# Patient Record
Sex: FEMALE | Race: WHITE | URBAN - NONMETROPOLITAN AREA
[De-identification: names, ages, dates, MRNs, and addresses within clinical notes are randomized per-mention and may not be internally consistent; named-entity substitution may affect disease eponyms.]

---

## 2019-05-16 ENCOUNTER — OFFICE VISIT (OUTPATIENT)
Dept: PSYCHIATRY | Age: 64
End: 2019-05-16
Payer: COMMERCIAL

## 2019-05-16 VITALS — BODY MASS INDEX: 29.51 KG/M2 | HEIGHT: 67 IN | WEIGHT: 188 LBS

## 2019-05-16 DIAGNOSIS — F33.0 MILD EPISODE OF RECURRENT MAJOR DEPRESSIVE DISORDER (HCC): Primary | ICD-10-CM

## 2019-05-16 DIAGNOSIS — F41.9 ANXIETY: ICD-10-CM

## 2019-05-16 PROCEDURE — 90792 PSYCH DIAG EVAL W/MED SRVCS: CPT | Performed by: NURSE PRACTITIONER

## 2019-05-16 RX ORDER — ONDANSETRON 4 MG/1
4 TABLET, FILM COATED ORAL EVERY 8 HOURS PRN
COMMUNITY

## 2019-05-16 RX ORDER — FLUTICASONE PROPIONATE 50 MCG
2 SPRAY, SUSPENSION (ML) NASAL DAILY
COMMUNITY

## 2019-05-16 RX ORDER — OXYBUTYNIN CHLORIDE 10 MG/1
10 TABLET, EXTENDED RELEASE ORAL DAILY
COMMUNITY

## 2019-05-16 SDOH — SOCIAL STABILITY: SOCIAL NETWORK
IN A TYPICAL WEEK, HOW MANY TIMES DO YOU TALK ON THE PHONE WITH FAMILY, FRIENDS, OR NEIGHBORS?: MORE THAN THREE TIMES A WEEK

## 2019-05-16 SDOH — SOCIAL STABILITY: SOCIAL NETWORK: HOW OFTEN DO YOU ATTEND CHURCH OR RELIGIOUS SERVICES?: MORE THAN 4 TIMES PER YEAR

## 2019-05-16 SDOH — ECONOMIC STABILITY: INCOME INSECURITY: HOW HARD IS IT FOR YOU TO PAY FOR THE VERY BASICS LIKE FOOD, HOUSING, MEDICAL CARE, AND HEATING?: NOT VERY HARD

## 2019-05-16 SDOH — HEALTH STABILITY: PHYSICAL HEALTH: ON AVERAGE, HOW MANY DAYS PER WEEK DO YOU ENGAGE IN MODERATE TO STRENUOUS EXERCISE (LIKE A BRISK WALK)?: 0 DAYS

## 2019-05-16 SDOH — SOCIAL STABILITY: SOCIAL NETWORK
DO YOU BELONG TO ANY CLUBS OR ORGANIZATIONS SUCH AS CHURCH GROUPS UNIONS, FRATERNAL OR ATHLETIC GROUPS, OR SCHOOL GROUPS?: YES

## 2019-05-16 SDOH — ECONOMIC STABILITY: TRANSPORTATION INSECURITY
IN THE PAST 12 MONTHS, HAS LACK OF TRANSPORTATION KEPT YOU FROM MEETINGS, WORK, OR FROM GETTING THINGS NEEDED FOR DAILY LIVING?: NO

## 2019-05-16 SDOH — SOCIAL STABILITY: SOCIAL INSECURITY: WITHIN THE LAST YEAR, HAVE YOU BEEN AFRAID OF YOUR PARTNER OR EX-PARTNER?: NO

## 2019-05-16 SDOH — SOCIAL STABILITY: SOCIAL NETWORK: HOW OFTEN DO YOU ATTENT MEETINGS OF THE CLUB OR ORGANIZATION YOU BELONG TO?: MORE THAN 4 TIMES PER YEAR

## 2019-05-16 SDOH — ECONOMIC STABILITY: FOOD INSECURITY: WITHIN THE PAST 12 MONTHS, THE FOOD YOU BOUGHT JUST DIDN'T LAST AND YOU DIDN'T HAVE MONEY TO GET MORE.: NEVER TRUE

## 2019-05-16 SDOH — SOCIAL STABILITY: SOCIAL NETWORK: ARE YOU MARRIED, WIDOWED, DIVORCED, SEPARATED, NEVER MARRIED, OR LIVING WITH A PARTNER?: MARRIED

## 2019-05-16 SDOH — HEALTH STABILITY: MENTAL HEALTH
STRESS IS WHEN SOMEONE FEELS TENSE, NERVOUS, ANXIOUS, OR CAN'T SLEEP AT NIGHT BECAUSE THEIR MIND IS TROUBLED. HOW STRESSED ARE YOU?: TO SOME EXTENT

## 2019-05-16 SDOH — SOCIAL STABILITY: SOCIAL NETWORK: HOW OFTEN DO YOU GET TOGETHER WITH FRIENDS OR RELATIVES?: MORE THAN THREE TIMES A WEEK

## 2019-05-16 SDOH — ECONOMIC STABILITY: FOOD INSECURITY: WITHIN THE PAST 12 MONTHS, YOU WORRIED THAT YOUR FOOD WOULD RUN OUT BEFORE YOU GOT MONEY TO BUY MORE.: NEVER TRUE

## 2019-05-16 SDOH — SOCIAL STABILITY: SOCIAL INSECURITY: WITHIN THE LAST YEAR, HAVE YOU BEEN HUMILIATED OR EMOTIONALLY ABUSED IN OTHER WAYS BY YOUR PARTNER OR EX-PARTNER?: NO

## 2019-05-16 SDOH — SOCIAL STABILITY: SOCIAL INSECURITY
WITHIN THE LAST YEAR, HAVE YOU BEEN KICKED, HIT, SLAPPED, OR OTHERWISE PHYSICALLY HURT BY YOUR PARTNER OR EX-PARTNER?: NO

## 2019-05-16 SDOH — SOCIAL STABILITY: SOCIAL INSECURITY
WITHIN THE LAST YEAR, HAVE TO BEEN RAPED OR FORCED TO HAVE ANY KIND OF SEXUAL ACTIVITY BY YOUR PARTNER OR EX-PARTNER?: NO

## 2019-05-16 SDOH — ECONOMIC STABILITY: TRANSPORTATION INSECURITY
IN THE PAST 12 MONTHS, HAS THE LACK OF TRANSPORTATION KEPT YOU FROM MEDICAL APPOINTMENTS OR FROM GETTING MEDICATIONS?: NO

## 2019-05-16 ASSESSMENT — ANXIETY QUESTIONNAIRES
3. WORRYING TOO MUCH ABOUT DIFFERENT THINGS: 1-SEVERAL DAYS
2. NOT BEING ABLE TO STOP OR CONTROL WORRYING: 0-NOT AT ALL SURE
5. BEING SO RESTLESS THAT IT IS HARD TO SIT STILL: 0-NOT AT ALL SURE
GAD7 TOTAL SCORE: 3
1. FEELING NERVOUS, ANXIOUS, OR ON EDGE: 1-SEVERAL DAYS
6. BECOMING EASILY ANNOYED OR IRRITABLE: 1-SEVERAL DAYS
7. FEELING AFRAID AS IF SOMETHING AWFUL MIGHT HAPPEN: 0-NOT AT ALL SURE
4. TROUBLE RELAXING: 0-NOT AT ALL SURE

## 2019-05-16 ASSESSMENT — PATIENT HEALTH QUESTIONNAIRE - PHQ9
8. MOVING OR SPEAKING SO SLOWLY THAT OTHER PEOPLE COULD HAVE NOTICED. OR THE OPPOSITE, BEING SO FIGETY OR RESTLESS THAT YOU HAVE BEEN MOVING AROUND A LOT MORE THAN USUAL: 0
3. TROUBLE FALLING OR STAYING ASLEEP: 1
5. POOR APPETITE OR OVEREATING: 1
SUM OF ALL RESPONSES TO PHQ QUESTIONS 1-9: 3
6. FEELING BAD ABOUT YOURSELF - OR THAT YOU ARE A FAILURE OR HAVE LET YOURSELF OR YOUR FAMILY DOWN: 0
4. FEELING TIRED OR HAVING LITTLE ENERGY: 1
7. TROUBLE CONCENTRATING ON THINGS, SUCH AS READING THE NEWSPAPER OR WATCHING TELEVISION: 0
1. LITTLE INTEREST OR PLEASURE IN DOING THINGS: 0
2. FEELING DOWN, DEPRESSED OR HOPELESS: 0
10. IF YOU CHECKED OFF ANY PROBLEMS, HOW DIFFICULT HAVE THESE PROBLEMS MADE IT FOR YOU TO DO YOUR WORK, TAKE CARE OF THINGS AT HOME, OR GET ALONG WITH OTHER PEOPLE: 0
9. THOUGHTS THAT YOU WOULD BE BETTER OFF DEAD, OR OF HURTING YOURSELF: 0
SUM OF ALL RESPONSES TO PHQ9 QUESTIONS 1 & 2: 0
SUM OF ALL RESPONSES TO PHQ QUESTIONS 1-9: 3
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
## 2019-05-16 NOTE — PATIENT INSTRUCTIONS
Stop Cymbalta by taking Cymbalta 20mg 1 tab by mouth once daily on Saturday and Monday and then stop. Stop Lorazepam (Ativan) 1mg by taking Lorazepam 1mg one-half tablet by mouth every other day for 5 doses and then stop. Patient has been instructed to seek emergency help via the emergency and/or calling 911 should symptoms become severe, worsen, or with other concerning symptoms. Patient instructed to go immediately to the emergency room and/or call 911 with any suicidal or homicidal ideations or if audio/visual hallucinations develop. Patient given crisis center information.

## 2019-05-22 ENCOUNTER — OFFICE VISIT (OUTPATIENT)
Dept: FAMILY MEDICINE CLINIC | Age: 64
End: 2019-05-22
Payer: COMMERCIAL

## 2019-05-22 VITALS
WEIGHT: 184.2 LBS | RESPIRATION RATE: 10 BRPM | HEIGHT: 65 IN | SYSTOLIC BLOOD PRESSURE: 124 MMHG | OXYGEN SATURATION: 98 % | HEART RATE: 65 BPM | BODY MASS INDEX: 30.69 KG/M2 | DIASTOLIC BLOOD PRESSURE: 78 MMHG | TEMPERATURE: 98.2 F

## 2019-05-22 DIAGNOSIS — K58.2 IRRITABLE BOWEL SYNDROME WITH BOTH CONSTIPATION AND DIARRHEA: ICD-10-CM

## 2019-05-22 DIAGNOSIS — R10.13 EPIGASTRIC PAIN: Primary | ICD-10-CM

## 2019-05-22 DIAGNOSIS — R53.83 TIRED: ICD-10-CM

## 2019-05-22 DIAGNOSIS — I10 ESSENTIAL HYPERTENSION: ICD-10-CM

## 2019-05-22 DIAGNOSIS — G47.9 SLEEP DIFFICULTIES: ICD-10-CM

## 2019-05-22 DIAGNOSIS — M54.40 LOW BACK PAIN WITH SCIATICA, SCIATICA LATERALITY UNSPECIFIED, UNSPECIFIED BACK PAIN LATERALITY, UNSPECIFIED CHRONICITY: ICD-10-CM

## 2019-05-22 DIAGNOSIS — R19.4 CHANGE IN BOWEL HABITS: ICD-10-CM

## 2019-05-22 DIAGNOSIS — E78.5 ELEVATED LIPIDS: ICD-10-CM

## 2019-05-22 DIAGNOSIS — R41.3 MEMORY DIFFICULTIES: ICD-10-CM

## 2019-05-22 PROCEDURE — 99204 OFFICE O/P NEW MOD 45 MIN: CPT | Performed by: FAMILY MEDICINE

## 2019-05-22 RX ORDER — DICYCLOMINE HYDROCHLORIDE 10 MG/1
CAPSULE ORAL PRN
COMMUNITY
Start: 2019-04-15 | End: 2019-07-30 | Stop reason: ALTCHOICE

## 2019-05-22 RX ORDER — VITAMIN B COMPLEX
1 CAPSULE ORAL NIGHTLY
COMMUNITY
End: 2019-05-22

## 2019-05-22 RX ORDER — MULTIVITAMIN WITH IRON
250 TABLET ORAL
COMMUNITY

## 2019-05-22 RX ORDER — PROMETHAZINE HYDROCHLORIDE 25 MG/1
TABLET ORAL PRN
COMMUNITY
Start: 2019-04-15

## 2019-05-22 ASSESSMENT — ENCOUNTER SYMPTOMS
RESPIRATORY NEGATIVE: 1
DIARRHEA: 1
BACK PAIN: 1
ABDOMINAL PAIN: 1
NAUSEA: 1
SINUS PAIN: 1
ABDOMINAL DISTENTION: 1
EYE PAIN: 1
VOMITING: 1

## 2019-05-22 NOTE — PATIENT INSTRUCTIONS
Thank you   1. Thank you for trusting us with your healthcare needs. You may receive a survey regarding today's visit. It would help us out if you would take a few moments to provide your feedback. We value your input. 2. Please bring in ALL medications BOTTLES, including inhalers, herbal supplements, over the counter, prescribed & non-prescribed medicine. The office would like actual medication bottles and a list.   3. Please note our OFFICE POLICIES:   a. Prior to getting your labs drawn, please check with your insurance company for benefits and eligibility of lab services. Often, insurance companies cover certain tests for preventative visits only. It is patient's responsibility to see what is covered. b. We are unable to change a diagnosis after the test has been performed. c. Lab orders will not be re-printed. Please hold onto your original lab orders and take them to your lab to be completed. d. If you no show your scheduled appointment three times, you will be dismissed from this practice. e. Tru Plata must be completed 24 hours prior to your schedule appointment. 4. If the list below has been completed, PLEASE FAX RECORDS TO OUR OFFICE @ [phone].  Once the records have been received we will update your records at our office:  Health Maintenance Due   Topic Date Due    Hepatitis C screen  1955    HIV screen  09/30/1970    DTaP/Tdap/Td vaccine (1 - Tdap) 09/30/1974    Cervical cancer screen  09/30/1976    Diabetes screen  09/30/1995    Shingles Vaccine (1 of 2) 09/30/2005    Colon cancer screen colonoscopy  09/30/2005    Potassium monitoring  12/20/2013    Creatinine monitoring  12/20/2013    Breast cancer screen  12/20/2014    Lipid screen  12/20/2017

## 2019-05-22 NOTE — PROGRESS NOTES
[address] [facility]. [address] [facility] [address]  Dept: [phone]  Dept Fax: [phone]  Loc: [phone]      Ivan Norton is a 61 y.o. White female. Joni Williamson  presents to the [facility] today for   Chief Complaint   Patient presents with   Kasandra May [facility] Protocol    Fatigue     chronic fatigue immuity, gi issues, diverticulitis-Dr. Jerzy Addison Hypertension     meds changes    Abdominal Pain     hiatal hernia, with bouts of vomiting and diarrhea    Depression     started in Dec   ,  and;   1. Epigastric pain    2. Essential hypertension    3. Tired    4. Change in bowel habits    5. Low back pain with sciatica, sciatica laterality unspecified, unspecified back pain laterality, unspecified chronicity    6. Irritable bowel syndrome with both constipation and diarrhea    7. Sleep difficulties    8. Memory difficulties    9. Elevated lipids      I have reviewed Ivan Norton medical, surgical and other pertinent history in detail, and have updated medication and allergy information in the computerized patientrecord. Clinical Care Team:     -Referring Provider for today's consult: Self Referred  -Primary Care Provider: UMER Sullivan - [facility]    Medical/Surgical History:   She  has a past medical history of Anxiety, Chronic kidney disease, Depression, Diverticulosis, EBV infection, Esophageal dilatation, Fractured rib, GERD (gastroesophageal reflux disease), Heart murmur, Hiatal hernia, Hyperlipidemia, and Hypertension. Her  has a past surgical history that includes Hysterectomy (2005); Tonsillectomy and adenoidectomy (1994); Bladder surgery (2006); Colonoscopy (2012); Upper gastrointestinal endoscopy (2016); Cholecystectomy; Gastric fundoplication (61/41/5492); and gastroplasty (06/28/2017).     Family/Social History:     Her family history includes Alcohol Abuse in her father; (ZOFRAN) 4 MG tablet, Take 4 mg by mouth every 8 hours as needed for Nausea or Vomiting, Disp: , Rfl:     VORTIoxetine (TRINTELLIX) 5 MG tablet, Take 1 tablet by mouth daily, Disp: 30 tablet, Rfl: 0    polyethylene glycol (MIRALAX) PACK packet, Take 17 g by mouth as needed , Disp: , Rfl:     Cholecalciferol (VITAMIN D3) 2000 UNITS CAPS, Take 2,000 Units by mouth daily, Disp: , Rfl:     olmesartan-hydrochlorothiazide (BENICAR HCT) 40-25 MG per tablet, Take 1 tablet by mouth daily, Disp: , Rfl:     pantoprazole (PROTONIX) 40 MG tablet, Take 40 mg by mouth every morning (before breakfast) , Disp: , Rfl:     celecoxib (CELEBREX) 200 MG capsule, Take 200 mg by mouth daily, Disp: , Rfl:     fenofibrate (TRICOR) 145 MG tablet, Take 145 mg by mouth daily , Disp: , Rfl:     pravastatin (PRAVACHOL) 80 MG tablet, Take 80 mg by mouth daily, Disp: , Rfl:     amLODIPine (NORVASC) 5 MG tablet, Take 5 mg by mouth daily , Disp: , Rfl:     ranitidine (ZANTAC) 150 MG tablet, Take 150 mg by mouth nightly, Disp: , Rfl:     Cetirizine HCl (ZYRTEC ALLERGY PO), Take 10 mg by mouth nightly , Disp: , Rfl:     Simethicone (MAALOX ANTI-GAS PO), Take by mouth as needed, Disp: , Rfl:   Allergies: Compazine [prochlorperazine maleate] and Penicillins,  Immunizations: There is no immunization history on file for this patient. History of PresentIllness:     Dina Prasad had concerns including Established New Doctor (C[facility] Protocol); Fatigue (chronic fatigue immuity, gi issues, diverticulitis-Dr. Raghav Antonio); Hypertension (meds changes); Abdominal Pain (hiatal hernia, with bouts of vomiting and diarrhea); and Depression (started in Dec). Vikash Bishop  presents to the [address] today for;   Chief Complaint   Patient presents with   Lilo Mora New Doctor     [facility] Protocol    Fatigue     chronic fatigue immuity, gi issues, diverticulitis-Dr. Frankie Tracy Hypertension     meds changes    Abdominal Pain     hiatal hernia, Thought content normal.   Nursing note and vitals reviewed. Laboratory Data:   Lab results were searched in Care Everywhere and/or those brought by the pateint were reviewed today with Brannon Houston and she has a copy of their most recent labs to take home with them as notedbelow;       Imaging Data:   Imaging Data:       Assessment & Plan:       Impression:  1. Epigastric pain    2. Essential hypertension    3. Tired    4. Change in bowel habits    5. Low back pain with sciatica, sciatica laterality unspecified, unspecified back pain laterality, unspecified chronicity    6. Irritable bowel syndrome with both constipation and diarrhea    7. Sleep difficulties    8. Memory difficulties    9. Elevated lipids      Assessment and Plan:  After reviewing the patients chief complaints, reviewing their labfindings in great detail (with the patient and those accompanying them) which correlate to their chief complaints, symptoms, and or medical conditions; suggestions were made relating to changes in diet and or supplementswhich may improve the complaints and which will be reflected in their future lab findings; Chief Complaint   Patient presents with   Betsy Alfred New Doctor     [facility] Protocol    Fatigue     chronic fatigue immuity, gi issues, diverticulitis-Dr. Ambika Tyson Hypertension     meds changes    Abdominal Pain     hiatal hernia, with bouts of vomiting and diarrhea    Depression     started in Dec   ;    Plans for the next visits:  - Abnormal and non-optimal Labs were ordered today to be repeated in the next 120-365 days to assess changes from adjustments in nutrition and or nutrients.    - Patient instructed when having ablood draw to ask the  to divide their lab draws into multiple draws over several days if not feeling good at the time of the lab draw or if either prefers to do several smaller blood draws over several days  -Patient instructed to check with insurer before each lab draw and to to to the lab which the insurer directs them for the most cost effective lab draw with the least patient's cost  - Peggy Vidales  will be scheduled subsequentto those results. - Peggy Vidales will bring in her drink and food log to her next visit    Chronic Problems Addressed on this Visit:                                   1.  Intensity of Service; Uncontrolled items at this visit; Chief Complaint   Patient presents with   Stefan Velasquez Doctor     [facility] Protocol    Fatigue     chronic fatigue immuity, gi issues, diverticulitis-Dr. Pilar Sommers Hypertension     meds changes    Abdominal Pain     hiatal hernia, with bouts of vomiting and diarrhea    Depression     started in Dec   ;              Improved items at this visit; Stable items atthis visit;  2. Patients food and drinks were reviewed with the patient,       - Peggy Vidales will bring food+drink symptom log to next visit for inclusion in their record      - 75 better food list reviewed & given topatient with the omega 6 food list to avoid         - Gluten in corn and oats abstracts sheet reviewed and given to the patient today   3. Greater than 45 minutes were spent face to face on this visit of which >50% was for counseling and coordination of care. Patients food and drinks were reviewed with thepatient,   - they will bring a food drink symptom log to future visits for inclusion in their record    - 75 better food list reviewed & given to patient along with the omega 6 food list to avoid      - Glutenin corn and oats abstracts sheet reviewed and given to the patient today    - 23 Foods containing Latex-like proteins was reviewed and copy to be taken if desired     - Nutrient Supplements list provided and copyto be taken if desired    - [email or web address]. [employer] web site offered to patient to review at their convenience by staff with login information    Note:  I have discussed with the patient that with all nutraceuticals, there is often mixed data and emerging research which needs to be monitored; as well as an array of NIHfact sheets on nutrients and supplements. If I have recommended cinnamon at the request of this patient to assist them in control of their blood sugar, triglyceride and or weight issues. I discussed that thepatient's clinical use of cinnamon bark, calcium, magnesium, Vitamin D and pharmaceutical grade [employer]S #955969 fish oil or triple-strength fish oil, and B-75 two phase time-released B complex by Mariam Bonds will be for atime-limited trial to determine their individual effectiveness and safety in this patient. I also referred the patient to the NMCD: Nutrition, Metabolism, and Cardiovascular Diseases (journal) and concerns about long-termuse and hepatotoxicity of cinnamon and other nutrients and suggest they frequently search nih.gov for the latest non-proprietary information on nutriceuticals as well as consider a subscription to [employer] fordetails on reviewed supplements, or at the least review the nutrient files at [address] at [facility], [employer], an insulin mimetic, reduces some High Carbohydrate Dietary Impacts. Methylhydroxychalcone polymers insulin-enhancing properties in fat cells are responsible for enhanced glucose uptake, inhibiting hepatic HMG-CoA reductase and lowers lipids. www.jacn. org/content/20/4/327.full     But cinnamon with additivessuch as Chromium or Cinnamon Extract are not effective as insulin mimetics.  https://www.read.net/     Nutrients for Start up from [employer] or [employer] for ease to get started now ;  Star Broderick has some useable products;  - Triple Strength Fish Oil, enteric coated  - Vit D 3 5000 IU gel caps  - Iron ferrous sulfat 325 mg tabs  - Centrum Silver look-a-like for most patients, or  - Centrum plain look-a-like if need iron    Localpharmacies or chains such as [facility], [facility], [employer], have;  - Triple mg tabs Item #832281 if need more iron for low iron on labs    Usually turns bowel movements grey, green or black but not a concern  - Time released Niacin 250 mg Item #178352 for cold intolerance, low libido or impotence  - DHEA 50 mg Item #847804 for improving DHEA levels on labs if having Fatigue    If stools too loose substitute for your Magnesium oxide using;   Magnesium citrate 200 mg tabs(NOT liquid) at [employer]   Magnesium gluconate 550 mgby Rajiv at [employer] or amazon. com  Magnesium chloride foot soaks or body sprays  www.agencyQ   Magnesium chloride flakes 14.99 Item #: VZE640 if Backordered get spray    Food Drink Symptom Log;  I asked this patient to track these items and any other symptoms on their list on a weekly basis to documenttheir progress or lack of same. This can be done on the symptom tracking sheet I gave them at today's visit but looks like this:                                                      Rate on scale of 0-10 with zero = notnoticeable  Symptom:                            Week 1               2                 3                 4               Etc            Hair loss    Foot cramps    Paresthesia    Aches    IBS (irritable bowel)    Constipation    Diarrhea  Nocturia    (up to bathroom at night)    Fatigue/Energy level  Stress      On the other side of the sheet they can track their food, drink, environment, activity, symptoms etc      Avoiding Latex-like proteins inmy foods; Avocados, Bananas, Celery, Figs & Kiwi proteins have latex-like proteins to inflame our immunesystems  How Can I Have A Latex Allergy? Eating foods with latex-like protein exposes us to latex allergies. Our body cannot tell the differencebetween these latex-like proteins and latex from rubber products since many people are allergic to fruit, vegetables and latex. Read labels on pre-packaged foods.  This list to avoid is only a guide if you are known allergicto latex or have a latex fall red raspberries, squash, sweet corn, tomatoes, turnips, watermelons  October; apples, beets, broccoli, cabbage, carrots, cauliflower, celery, green onions, greens, lettuce, parsley, peas, pears, potatoes,pumpkins, radishes, fall red raspberries, squash, turnips  November; broccoli, cabbage, carrots, parsley,pears, peas  December: use canned, frozen ordried fruits since lower in latex    Upto half of latex-sensitive patients show allergic reactions to fruits (avocados, bananas, kiwifruits, papayas, peaches),   Annals of Allergy, 1994. These plants contain the same proteins that are allergens in latex. People with fruit allergies should warn physicians beforeundergoing procedures which may cause anaphylactic reaction if in contact with latex gloves. Some of the common foods with defined cross-reactivity to latexare avocado, banana, kiwi, chestnut, raw potato, tomato,stone fruits (e.g., peach, cherry), hazelnut, melons, celery, carrot, apple, pear, papaya, and almond. Foods with less well-defined cross-reactivity to latex are peanuts, peppers, citrus fruits, coconut, pineapple, gigi,fig, passion fruit, Ugli fruit, and grape    This fruit/latex cross-reactivity is worsened by ethylene, a gas used to hasten commercial ripening. In nature, plants produce low levels of the hormone ethylene, which regulates germination, flowering, and ripening. Forced ripening by high ethyleneconcentrations, plants produce allergenic wound-repair proteins, which are similar to wound-repair proteins made during the tapping of rubber trees. Sensitive individualswho ingest the fruit get a higher dose and worse reaction. Some people may even first become sensitized to latex through fruit. Can food processing increase theconcentrations of allergenic proteins? Latex-sensitized children (and adults) in [place] often experience allergic reactions after eating bananas ripenedartificially with ethylene.  In the [place], food

## 2019-05-28 ENCOUNTER — TELEPHONE (OUTPATIENT)
Dept: PSYCHIATRY | Age: 64
End: 2019-05-28

## 2019-05-28 NOTE — TELEPHONE ENCOUNTER
Yves Quintanilla contacted office seeking advice, Yves Quintanilla was established as a new pt.and seen by Saqib Rosales who is out of the office today. On 5/16 and started on trinntelix. She is not sleeping, its taking 3 hrs to go to sleep then wakes up with panic attacks. Scheduled for follow up on 5/31. She wants to know if trinntelix could cause this and should she discontinue until seen. also c/o being hot and sweaty then cold and chills, had gotten up feeling light headed and h/a ,fell and hit her head which caused a black eye. Please advice.

## 2019-05-29 ENCOUNTER — TELEPHONE (OUTPATIENT)
Dept: PSYCHIATRY | Age: 64
End: 2019-05-29

## 2019-05-30 ENCOUNTER — OFFICE VISIT (OUTPATIENT)
Dept: PSYCHIATRY | Age: 64
End: 2019-05-30
Payer: COMMERCIAL

## 2019-05-30 DIAGNOSIS — F41.9 ANXIETY: ICD-10-CM

## 2019-05-30 DIAGNOSIS — F33.0 MILD EPISODE OF RECURRENT MAJOR DEPRESSIVE DISORDER (HCC): Primary | ICD-10-CM

## 2019-05-30 PROCEDURE — 99214 OFFICE O/P EST MOD 30 MIN: CPT | Performed by: NURSE PRACTITIONER

## 2019-05-30 RX ORDER — BUSPIRONE HYDROCHLORIDE 5 MG/1
5 TABLET ORAL 3 TIMES DAILY
Qty: 90 TABLET | Refills: 0 | Status: SHIPPED | OUTPATIENT
Start: 2019-05-30 | End: 2019-06-23 | Stop reason: SDUPTHER

## 2019-05-30 RX ORDER — LORAZEPAM 1 MG/1
1 TABLET ORAL DAILY
Qty: 30 TABLET | Refills: 0
Start: 2019-05-30 | End: 2019-06-29

## 2019-05-30 RX ORDER — FLUOXETINE 10 MG/1
10 CAPSULE ORAL DAILY
Qty: 30 CAPSULE | Refills: 0 | Status: SHIPPED | OUTPATIENT
Start: 2019-05-30 | End: 2019-06-23 | Stop reason: SDUPTHER

## 2019-05-30 RX ORDER — LORAZEPAM 1 MG/1
.5-1 TABLET ORAL 2 TIMES DAILY PRN
COMMUNITY
End: 2019-05-30 | Stop reason: DRUGHIGH

## 2019-05-30 NOTE — PROGRESS NOTES
[address] [place] [address]  Brandon Alcantara 83  Dept: [phone]  Dept Fax: [phone]  Loc: [phone]    Visit Date: 5/30/2019    SUBJECTIVE DATA     CHIEF COMPLAINT:    Chief Complaint   Patient presents with    Anxiety    Discuss Medications    Follow-up       History obtained from: patient    HISTORY OF PRESENT ILLNESS:    Gama Trejo is a 61 y.o. female who presents to the office for follow-up on depression and anxiety. Patient states \"ever since I started the Trintellix I started feeling zombie-taylor\". She does not like how the medication is making her feel. Since her last visit patient reports she had a \"panic attack\"  -woke her from her sleep around 3a so she got up  -then felt light headed and dizzy  -had a \"panic attack\", which she describes as feeling \"real anxious, felt like I couldn't control anything. \"  -walked around her kitchen  -thought she was going to pass out  -doesn't think she passed out, but thinks \"I just laid down hard\"  -took some ativan   -had dry heaves    Took the Trintellix again the following night  -got real anxious and \"I couldn't get into bed. \"  -was anxious about the storms that night as well because \"we don't have a basement and I'm claustrophobic\"     Stopped taking the Trintellix  -last dose was Monday of this week  -has not had significant anxiety since stopping the medication    Called the \"crisis center but no one got back to me\"     Blood pressure is still slightly elevated despite stopping the Cymbalta, but states it has improved overall    Starts back to work next week    Still having increased anxiety    Clarifies she is actually taking Ativan regularly  -Taking ativan 1mg 0.5 tablet daily and also taking ativan 1mg 0.5-1 tablet QHS  -thinks she has 20-30 tabs left at this time  -her PCP is writing it     Denies suicidal ideations, intent, plan. No homicidal ideations, intent, plan.  No audiovisual hallucinations. HPI    Past psychiatric medications include: Zoloft (weight gain); Paxil (no help); Celexa (\"I think worked [employer]"); Lexapro; Ambien (dizziness); Lunesta (metal taste in mouth)    Adverse reactionsfrom psychotropic medications:  Trintellix caused worsening anxiety and \"feeling zombie-taylor\"      Current Psychiatric Review of Systems         Blanca or Hypomania:  no     Panic Attacks:  no     Phobias:  no     Obsessions and Compulsions:  no     Body or Vocal Tics:  no     Hallucinations:  no     Delusions:  no    SOCIAL HISTORY:  Patient was born in [place] and raised by her biological parents      Social History     Socioeconomic History    Marital status:      Spouse name: Jasmin Mcfadden Number of children: Not on file    Years of education: Not on file    Highest education level: Bachelor's degree (e.g., BA, AB, BS)   Occupational History    Not on file   Social Needs    Financial resource strain: Not very hard    Food insecurity:     Worry: Never true     Inability: Never true   LLUSTRE needs:     Medical: No     Non-medical: No   Tobacco Use    Smoking status: Never Smoker    Smokeless tobacco: Never Used   Substance and Sexual Activity    Alcohol use: Yes     Comment: occasional    Drug use: No    Sexual activity: Yes     Partners: Male   Lifestyle    Physical activity:     Days per week: 0 days     Minutes per session: Not on file    Stress:  To some extent   Relationships    Social connections:     Talks on phone: More than three times a week     Gets together: More than three times a week     Attends Synagogue service: More than 4 times per year     Active member of club or organization: Yes     Attends meetings of clubs or organizations: More than 4 times per year     Relationship status:     Intimate partner violence:     Fear of current or ex partner: No     Emotionally abused: No     Physically abused: No     Forced sexual activity: No   Other Topics Concern    Not on file   Social History Narrative    2019    LEVEL OF EDUCATION: graduated high school; 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 Worked at [employer] from [id number] as . 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 Worked at [employer] from  - 2017. States she worked at [address] [place] [phone]. She states \"I think they let me go because I was over on my FMLA. \" They were also downsizing per her report. States she worked at [employer] from 2007 through Dec. 2010. MARRIAGES: once.  She and her   Oct. 16, 1976    CHILDREN: 2 adult daughters    SUBSTANCE USE: None       FAMILY HISTORY:   Family History   Problem Relation Age of Onset    Heart Disease Mother 55    Depression Mother     Other Father 80        myeloidosis    Colon Polyps Father [de-identified]    Kidney Disease Father     Alcohol Abuse Father     Thyroid Cancer Sister 24    Ovarian Cancer Sister 48    Kidney Cancer Paternal Grandmother [de-identified]    Other Sister         chronic fatigue; eating disorder    Hypertension Sister     Colon Cancer Neg Hx        Psychiatric Family History  As noted above    PAST MEDICAL HISTORY:    Past Medical History:   Diagnosis Date    Anxiety     Chronic kidney disease     Depression     Diverticulosis     EBV infection     Esophageal dilatation 2016    Fractured rib     GERD (gastroesophageal reflux disease)     Heart murmur     Hiatal hernia     mouth daily      pantoprazole (PROTONIX) 40 MG tablet Take 40 mg by mouth every morning (before breakfast)       celecoxib (CELEBREX) 200 MG capsule Take 200 mg by mouth daily      fenofibrate (TRICOR) 145 MG tablet Take 145 mg by mouth daily       pravastatin (PRAVACHOL) 80 MG tablet Take 80 mg by mouth daily      amLODIPine (NORVASC) 5 MG tablet Take 5 mg by mouth daily       ranitidine (ZANTAC) 150 MG tablet Take 150 mg by mouth nightly      Cetirizine HCl (ZYRTEC ALLERGY PO) Take 10 mg by mouth nightly       Simethicone (MAALOX ANTI-GAS PO) Take by mouth as needed      LORazepam (ATIVAN) 1 MG tablet Take 0.5-1 mg by mouth 2 times daily as needed for Anxiety.  VORTIoxetine (TRINTELLIX) 5 MG tablet Take 1 tablet by mouth daily 30 tablet 0     No facility-administered medications prior to visit. ALLERGIES:    Compazine [prochlorperazine maleate] and Penicillins    REVIEW OF SYSTEMS:    Review of Systems    The patient sees UMER Hurtado CNP as her primary care provider. SPECIALISTS: Dr. Nicolasa Gong (GI)    OBJECTIVE DATA     There were no vitals taken for this visit.     Physical Exam    Mental Status Evaluation:   Orientation: Alert, oriented, thought content appropriate   Mood:. anxious      Affect:  normal      Appearance:  Clean, well-groomed, age appropriate and casually dressed   Activity:  Restless & fidgety   Gait/Posture: Normal   Speech:  clear, fluent, normal pitch and volume, age and situation appropriate   Thought Process:  within normal limits   Thought Content:  within normal limits   Cognition:  grossly intact   Memory: intact   Insight:  good   Judgment: good   Suicidal Ideations: denies suicidal ideation   Homicidal Ideations: Negative for homicidal ideation      Medication Side Effects: absent       Attention Span attention span and concentration were age appropriate     Screenings Completed in This Encounter:     Anxiety and Depression:                    DIAGNOSIS AND ASSESSMENT DATA     DIAGNOSIS:   1. Mild episode of recurrent major depressive disorder ([facility] [address]    2. Anxiety        PLAN   Follow-up:  Return in about 1 month (around 6/27/2019), or if symptoms worsen or fail to improve, for follow-up and medication management. Prescriptions for this encounter:  New Prescriptions    BUSPIRONE (BUSPAR) 5 MG TABLET    Take 1 tablet by mouth 3 times daily    FLUOXETINE (PROZAC) 10 MG CAPSULE    Take 1 capsule by mouth daily       Orders Placed This Encounter   Medications    busPIRone (BUSPAR) 5 MG tablet     Sig: Take 1 tablet by mouth 3 times daily     Dispense:  90 tablet     Refill:  0    FLUoxetine (PROZAC) 10 MG capsule     Sig: Take 1 capsule by mouth daily     Dispense:  30 capsule     Refill:  0    LORazepam (ATIVAN) 1 MG tablet     Sig: Take 1 tablet by mouth daily for 30 days. Dispense:  30 tablet     Refill:  0       Medications Discontinued During This Encounter   Medication Reason    VORTIoxetine (TRINTELLIX) 5 MG tablet Side effects    LORazepam (ATIVAN) 1 MG tablet DOSE ADJUSTMENT       Additional orders:  No orders of the defined types were placed in this encounter. Patient reporting an intolerance to Trintellix, which she stopped prior to her appointment. She is reporting today that she has been taking the Lorazepam 1 mg on a regular basis rather than only as needed as previously reported. Discussed the importance of avoiding abrupt cessation of benzodiazepines. Patient will begin lorazepam taper at this time. She will take lorazepam 1 mg take 1 tablet by mouth once daily. With regards to the continued anxiety and mood dysregulation, patient will start BuSpar 5 mg by mouth 3 times daily and Prozac 10 mg by mouth once daily. Supportive therapy provided. Patient encouraged to actively participate in individual psychotherapy.  Patient is encouraged to utilize nonpharmacologic coping skills such as deep breathing, guided imagery, guided meditation,

## 2019-05-31 ENCOUNTER — TELEPHONE (OUTPATIENT)
Dept: PSYCHIATRY | Age: 64
End: 2019-05-31

## 2019-06-01 LAB
AMYLASE: 53 U/L (ref 28–100)
AVERAGE GLUCOSE: 108 MG/DL (ref 66–114)
CALCIUM SERPL-MCNC: 9.7 MG/DL (ref 8.5–10.5)
ESTRADIOL LEVEL: 18.5 PG/ML
FOLLICLE STIMULATING HORMONE: 72.3 MIU/ML
HBA1C MFR BLD: 5.4 %
HIGH SENSITIVE C-REACTIVE PROTEIN: 0.08 MG/DL (ref 0–0.74)
LH: 28.5 MIU/ML
LIPASE: 25 U/L (ref 11–82)
MAGNESIUM: 1.8 MG/DL (ref 1.8–2.6)
PROGESTERONE LEVEL: 0.4 NG/ML
RHEUMATOID FACTOR: <10 IU/ML
SEDIMENTATION RATE, ERYTHROCYTE: 13 MM/HR (ref 0–30)
T3 FREE: 3.87 PG/ML (ref 2.5–3.9)
T3 TOTAL: 113 NG/DL (ref 87–178)
T4 FREE: 0.99 NG/DL (ref 0.61–1.6)
T4 TOTAL: 11.1 UG/DL (ref 6.1–12.2)
TSH SERPL DL<=0.05 MIU/L-ACNC: 1.12 UIU/ML (ref 0.49–4.67)

## 2019-06-03 LAB
ANTI-NUCLEAR ANTIBODY (ANA): NORMAL
HOMOCYSTINE, SERUM: 12 UMOL/L

## 2019-06-04 LAB
DHEAS (DHEA SULFATE): 34 UG/DL (ref 19–205)
GLIADIN ANTIBODIES IGA: 3.7 U/ML
GLIADIN ANTIBODIES IGG: 1.3 U/ML
PARATHYROID HORMONE INTACT: 10 PG/ML (ref 15–65)
THYROID PEROXIDASE ANTIBODY: <1 IU/ML
VITAMIN D 25-HYDROXY: 50 NG/ML

## 2019-06-05 LAB
ALBUMIN SERUM: 4.4 G/DL (ref 3.6–5.1)
DEHYDROEPIANDROSTERONE: 0.6 NG/ML (ref 1.3–9.8)
SEX HORMONE BINDING GLOBULIN: 62.9 NMOL/L (ref 17.3–125)
TESTOSTERONE FREE: 3.5 PG/ML (ref 0.6–3.8)
TESTOSTERONE, LCMS: 30 NG/DL (ref 5–32)

## 2019-06-24 ENCOUNTER — TELEPHONE (OUTPATIENT)
Dept: FAMILY MEDICINE CLINIC | Age: 64
End: 2019-06-24

## 2019-06-24 RX ORDER — BUSPIRONE HYDROCHLORIDE 5 MG/1
TABLET ORAL
Qty: 90 TABLET | Refills: 0 | Status: SHIPPED | OUTPATIENT
Start: 2019-06-24 | End: 2019-07-15 | Stop reason: DRUGHIGH

## 2019-06-24 RX ORDER — FLUOXETINE 10 MG/1
10 CAPSULE ORAL DAILY
Qty: 30 CAPSULE | Refills: 0 | Status: SHIPPED | OUTPATIENT
Start: 2019-06-24 | End: 2019-07-15 | Stop reason: DRUGHIGH

## 2019-07-01 ENCOUNTER — TELEPHONE (OUTPATIENT)
Dept: PSYCHIATRY | Age: 64
End: 2019-07-01

## 2019-07-01 NOTE — TELEPHONE ENCOUNTER
Anat Weeks wrote into the office via [employer]:    Ms. Riana Villa,   I wanted to let you know Nova Mora been off the Ativan about 10 days. I weaned myself off slowly. I started a new OTC brand DHEA 5 mg from Dr. Yaa Kincaid. I had another brand I used and it made me feel anxious but didnt realize it was causing it til now  The brand I started Thurs night must have been stronger. I emailed Dr. Deandre Hayes and stopped it Garry David and today. I had almost a panic attack  Sat night and last night. I read  scripture, walked and that helped thru the anxiety. Just wanted to let you know.  Im feeling a little better today. I couldnt fall asleep last night but hoping I can tonight. Thanks   Elyssa Darling     Patient's last completed appt was on 05/30/19 to return on 07/15/19. Please advise for anything further.

## 2019-07-15 ENCOUNTER — OFFICE VISIT (OUTPATIENT)
Dept: PSYCHIATRY | Age: 64
End: 2019-07-15
Payer: COMMERCIAL

## 2019-07-15 ENCOUNTER — TELEPHONE (OUTPATIENT)
Dept: FAMILY MEDICINE CLINIC | Age: 64
End: 2019-07-15

## 2019-07-15 DIAGNOSIS — F41.9 ANXIETY: ICD-10-CM

## 2019-07-15 DIAGNOSIS — F33.0 MILD EPISODE OF RECURRENT MAJOR DEPRESSIVE DISORDER (HCC): Primary | ICD-10-CM

## 2019-07-15 PROCEDURE — 99214 OFFICE O/P EST MOD 30 MIN: CPT | Performed by: NURSE PRACTITIONER

## 2019-07-15 RX ORDER — BUSPIRONE HYDROCHLORIDE 10 MG/1
TABLET ORAL
Qty: 45 TABLET | Refills: 1 | Status: SHIPPED | OUTPATIENT
Start: 2019-07-15 | End: 2019-07-30 | Stop reason: DRUGHIGH

## 2019-07-15 RX ORDER — FLUOXETINE HYDROCHLORIDE 20 MG/1
20 CAPSULE ORAL DAILY
Qty: 30 CAPSULE | Refills: 1 | Status: SHIPPED | OUTPATIENT
Start: 2019-07-15 | End: 2019-08-07 | Stop reason: SINTOL

## 2019-07-15 NOTE — PROGRESS NOTES
[address] [place] [address]  Brandon Alcantara 83  Dept: [phone]  Dept Fax: [phone]  Loc: [phone]    Visit Date: 7/15/2019    SUBJECTIVE DATA     CHIEF COMPLAINT:    Chief Complaint   Patient presents with    Anxiety    Depression    Follow-up       History obtained from: patient    HISTORY OF PRESENT ILLNESS:    Mamta Stahl is a 61 y.o. female who presents to the office for follow-up on depression and anxiety. Patient states she is \"Doing better\"  Saw Dr. Apolinar Meigs since her last visit - he started her on DHEA to stimulate the adrenal glands and \"that stimulated more than that\"  -stopped th eDHEA and the time released vitamins   -believes they were contributing to some increased anxiety and restlessness    Sleeping better  -sleeping 4-7 hours uninterrupted     Still with some anxiety  -worries, but \"it doesn't get to that point where you go over the edge\"  -better able to stop the worry, but still struggles at times  -using meditation  -using thought stopping  -reading scripture has been helpful  -feels anxious at times    Tapered self off Ativan  -has been off for about 3 weeks    Work is \"winding down\" with the semester  -this summer semester was difficult  -had to do some work from home  -\"I didn't have time for myself\"  -\"I've realized I sometimes try to do too much at once. \"          PRIOR:  Patient states \"ever since I started the Trintellix I started feeling zombie-taylor\". She does not like how the medication is making her feel. Since her last visit patient reports she had a \"panic attack\"  -woke her from her sleep around 3a so she got up  -then felt light headed and dizzy  -had a \"panic attack\", which she describes as feeling \"real anxious, felt like I couldn't control anything. \"  -walked around her kitchen  -thought she was going to pass out  -doesn't think she passed out, but thinks \"I just laid down Transportation needs:     Medical: No     Non-medical: No   Tobacco Use    Smoking status: Never Smoker    Smokeless tobacco: Never Used   Substance and Sexual Activity    Alcohol use: Yes     Comment: occasional    Drug use: No    Sexual activity: Yes     Partners: Male   Lifestyle    Physical activity:     Days per week: 0 days     Minutes per session: Not on file    Stress: To some extent   Relationships    Social connections:     Talks on phone: More than three times a week     Gets together: More than three times a week     Attends Mandaen service: More than 4 times per year     Active member of club or organization: Yes     Attends meetings of clubs or organizations: More than 4 times per year     Relationship status:     Intimate partner violence:     Fear of current or ex partner: No     Emotionally abused: No     Physically abused: No     Forced sexual activity: No   Other Topics Concern    Not on file   Social History Narrative    2019    LEVEL OF EDUCATION: graduated high school; 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 Worked at [employer] from [id number] as . Then returned to faculty at [employer] in  and is now working full-time. Worked at [employer] from  - 2017. States she worked at [employer] [phone]. She states \"I think they let me go because I was over on my FMLA. \" They were also downsizing per her report. States she worked at [employer] from 2007 through Dec. 2010. release tablet Take 10 mg by mouth daily      fluticasone (FLONASE) 50 MCG/ACT nasal spray 2 sprays by Each Nare route daily      ondansetron (ZOFRAN) 4 MG tablet Take 4 mg by mouth every 8 hours as needed for Nausea or Vomiting      Cholecalciferol (VITAMIN D3) 2000 UNITS CAPS Take 2,000 Units by mouth daily      olmesartan-hydrochlorothiazide (BENICAR HCT) 40-25 MG per tablet Take 1 tablet by mouth daily      pantoprazole (PROTONIX) 40 MG tablet Take 40 mg by mouth every morning (before breakfast)       celecoxib (CELEBREX) 200 MG capsule Take 200 mg by mouth daily      fenofibrate (TRICOR) 145 MG tablet Take 145 mg by mouth daily       pravastatin (PRAVACHOL) 80 MG tablet Take 80 mg by mouth daily      amLODIPine (NORVASC) 5 MG tablet Take 5 mg by mouth daily       ranitidine (ZANTAC) 150 MG tablet Take 150 mg by mouth nightly      Cetirizine HCl (ZYRTEC ALLERGY PO) Take 10 mg by mouth nightly       Simethicone (MAALOX ANTI-GAS PO) Take by mouth as needed      FLUoxetine (PROZAC) 10 MG capsule Take 1 capsule by mouth daily 30 capsule 0    busPIRone (BUSPAR) 5 MG tablet TAKE ONE TABLET BY MOUTH THREE TIMES A DAY 90 tablet 0    Krill Oil (MAXIMUM RED KRILL PO) Take 500 mg by mouth 3 times daily       NONFORMULARY Protandim Life Advantage- 1/2 tab in am  Probio Life Advantage- 2 at hs  Melatonin 10 mg nightly  caltrate 600 plus D 3- bid      B Complex-Folic Acid (C-593 BALANCED TR PO) Take 1 tablet by mouth 3 times daily (before meals) [employer]      Lysine 500 MG TABS Take 1 tablet by mouth 4 times daily (before meals and nightly) Prevents viruses      polyethylene glycol (MIRALAX) PACK packet Take 17 g by mouth as needed        No facility-administered medications prior to visit. ALLERGIES:    Compazine [prochlorperazine maleate] and Penicillins    REVIEW OF SYSTEMS:    Review of Systems    The patient sees UMER Ndiaye CNP as her primary care provider.     SPECIALISTS: Dr. Tye Resendez

## 2019-07-16 ENCOUNTER — PATIENT MESSAGE (OUTPATIENT)
Dept: FAMILY MEDICINE CLINIC | Age: 64
End: 2019-07-16

## 2019-07-22 ENCOUNTER — TELEPHONE (OUTPATIENT)
Dept: PSYCHIATRY | Age: 64
End: 2019-07-22

## 2019-07-25 ENCOUNTER — TELEPHONE (OUTPATIENT)
Dept: PSYCHIATRY | Age: 64
End: 2019-07-25

## 2019-07-25 NOTE — TELEPHONE ENCOUNTER
Continue present program. Monitor symptoms and update early next week. Earlier appointment if available.

## 2019-07-30 ENCOUNTER — OFFICE VISIT (OUTPATIENT)
Dept: FAMILY MEDICINE CLINIC | Age: 64
End: 2019-07-30
Payer: COMMERCIAL

## 2019-07-30 VITALS
BODY MASS INDEX: 29.89 KG/M2 | DIASTOLIC BLOOD PRESSURE: 72 MMHG | HEIGHT: 65 IN | WEIGHT: 179.4 LBS | SYSTOLIC BLOOD PRESSURE: 122 MMHG | RESPIRATION RATE: 10 BRPM | TEMPERATURE: 98.4 F | HEART RATE: 55 BPM | OXYGEN SATURATION: 98 %

## 2019-07-30 DIAGNOSIS — E78.5 ELEVATED LIPIDS: ICD-10-CM

## 2019-07-30 DIAGNOSIS — R41.3 MEMORY DIFFICULTIES: ICD-10-CM

## 2019-07-30 DIAGNOSIS — G47.9 SLEEP DIFFICULTIES: ICD-10-CM

## 2019-07-30 DIAGNOSIS — R53.83 TIRED: ICD-10-CM

## 2019-07-30 DIAGNOSIS — I10 ESSENTIAL HYPERTENSION: Primary | ICD-10-CM

## 2019-07-30 DIAGNOSIS — R10.13 EPIGASTRIC PAIN: ICD-10-CM

## 2019-07-30 DIAGNOSIS — K58.2 IRRITABLE BOWEL SYNDROME WITH BOTH CONSTIPATION AND DIARRHEA: ICD-10-CM

## 2019-07-30 DIAGNOSIS — M54.40 LOW BACK PAIN WITH SCIATICA, SCIATICA LATERALITY UNSPECIFIED, UNSPECIFIED BACK PAIN LATERALITY, UNSPECIFIED CHRONICITY: ICD-10-CM

## 2019-07-30 DIAGNOSIS — R19.4 CHANGE IN BOWEL HABITS: ICD-10-CM

## 2019-07-30 PROCEDURE — 99214 OFFICE O/P EST MOD 30 MIN: CPT | Performed by: FAMILY MEDICINE

## 2019-07-30 RX ORDER — ESTRADIOL 2 MG/1
RING VAGINAL
Refills: 3 | COMMUNITY
Start: 2019-07-17 | End: 2019-10-10

## 2019-07-30 RX ORDER — CHLORAL HYDRATE 500 MG
1000 CAPSULE ORAL 4 TIMES DAILY
COMMUNITY

## 2019-07-30 ASSESSMENT — ENCOUNTER SYMPTOMS: EYE ITCHING: 1

## 2019-07-30 NOTE — PROGRESS NOTES
Psychiatric/Behavioral: Positive for dysphoric mood. Negative for decreased concentration and sleep disturbance. The patient is nervous/anxious. All other systems reviewed and are negative. Objective:     /72 (Site: Left Upper Arm, Position: Sitting, Cuff Size: Medium Adult)   Pulse 55   Temp 98.4 °F (36.9 °C) (Oral)   Resp 10   Ht 5' 5\" (1.651 m)   Wt 179 lb 6.4 oz (81.4 kg)   SpO2 98%   BMI 29.85 kg/m²   Physical Exam   Constitutional: She is oriented to person, place, and time. She appears well-developed and well-nourished. HENT:   Head: Normocephalic. Pulmonary/Chest: Effort normal.   Neurological: She is alert and oriented to person, place, and time. Psychiatric: She has a normal mood and affect. Thought content normal.   Nursing note and vitals reviewed. Laboratory Data:   Lab results were searched in Care Everywhere and/or those brought by the pateint were reviewed today with Jeremiah Payan and she has a copy of their most recent labs to take home with them as notedbelow;       Imaging Data:   Imaging Data:       Assessment & Plan:       Impression:  1. Essential hypertension    2. Tired    3. Epigastric pain    4. Change in bowel habits    5. Low back pain with sciatica, sciatica laterality unspecified, unspecified back pain laterality, unspecified chronicity    6. Irritable bowel syndrome with both constipation and diarrhea    7. Sleep difficulties    8. Memory difficulties    9. Elevated lipids      Assessment and Plan:  After reviewing the patients chief complaints, reviewing their labfindings in great detail (with the patient and those accompanying them) which correlate to their chief complaints, symptoms, and or medical conditions; suggestions were made relating to changes in diet and or supplementswhich may improve the complaints and which will be reflected in their future lab findings;   Chief Complaint   Patient presents with    Follow-up     2 MONTH    Discuss care.      Patients food and drinks were reviewed with thepatient,   - they will bring a food drink symptom log to future visits for inclusion in their record    - 75 better food list reviewed & given to patient along with the omega 6 food list to avoid      - Glutenin corn and oats abstracts sheet reviewed and given to the patient today    - 23 Foods containing Latex-like proteins was reviewed and copy to be taken if desired     - Nutrient Supplements list provided and copyto be taken if desired    - [employer] web site offered to patient to review at their convenience by staff with login information    Note:  I have discussed with the patient that with all nutraceuticals, there is often mixed data and emerging research which needs to be monitored; as well as an array of NIHfact sheets on nutrients and supplements. If I have recommended cinnamon at the request of this patient to assist them in control of their blood sugar, triglyceride and or weight issues. I discussed that thepatient's clinical use of cinnamon bark, calcium, magnesium, Vitamin D and pharmaceutical grade CVS #[id number] fish oil or triple-strength fish oil, and B-75 two phase time-released B complex by Sruthi Aguiar will be for atime-limited trial to determine their individual effectiveness and safety in this patient. I also referred the patient to the NMCD: Nutrition, Metabolism, and Cardiovascular Diseases (journal) and concerns about long-termuse and hepatotoxicity of cinnamon and other nutrients and suggest they frequently search nih.gov for the latest non-proprietary information on nutriceuticals as well as consider a subscription to [employer] fordetails on reviewed supplements, or at the least review the nutrient files at [address] [facility] [place] [facility], [employer], an insulin mimetic, reduces some High Carbohydrate Dietary Impacts.   Methylhydroxychalcone polymers insulin-enhancing properties in fat cells are responsible for enhanced glucose uptake, inhibiting hepatic HMG-CoA reductase and lowers lipids. www.jacn. org/content/20/4/327.full     But cinnamon with additivessuch as Chromium or Cinnamon Extract are not effective as insulin mimetics.  https://www.read.net/     Nutrients for Start up from [employer] or [employer] for ease to get started now ;  Star Broderick has some useable products;  - Triple Strength Fish Oil, enteric coated  - Vit D 3 5000 IU gel caps  - Iron ferrous sulfat 325 mg tabs  - Centrum Silver look-a-like for most patients, or  - Centrum plain look-a-like if need iron    Localpharmacies or chains such as [employer], [employer], have;  - Triple Strength Fish Oil (enteric coated ifavailable) or    If not enteric coated, can take from freezer for less burps  - B-50 or B-100 time released balanced B complex tabs  - Cinnamon bark 500 mg (without Chromium or extracts)   some brands list 1000 mg / serving of 2 capsules,    some brands have 1000 mg caps with the undesireable chromium / extract  - Calcium carbonate/citrate, magnesium oxide/citrate, Vit D 3  as 3-4 tabs/caps/serving     Some Local Brands may contain Zincwhich is acceptable for the first bottle or two  - Magnesium oxide 250 mg tabs for those having < 2 bowel movements daily  - Magnesium citrate 200 mg if having > 2bowel movement/day  - Centrum Silver or look-a-like for most patients, Centrum plain or look-a-like with iron  - Vitamin D-3 comes as 1,000 IU or 2,000 IU or 5,000 IU gel caps or Liquid drops      Some brands containing or derived from soy oil or corn oil are OK if not allergic to soy  - Elemental Iron 65 mg tabsat bedtime is available over the counter if need more iron     Usually turns bowel movements grey, green or black but not a concern  - Apricot Kernel Oil (by Now) for dry skin sensitive perineal or perianal area skin    Nutrients for ongoing use by Mail order for ethylene, a gas used to hasten commercial ripening. In nature, plants produce low levels of the hormone ethylene, which regulates germination, flowering, and ripening. Forced ripening by high ethyleneconcentrations, plants produce allergenic wound-repair proteins, which are similar to wound-repair proteins made during the tapping of rubber trees. Sensitive individualswho ingest the fruit get a higher dose and worse reaction. Some people may even first become sensitized to latex through fruit. Can food processing increase theconcentrations of allergenic proteins? Latex-sensitized children (and adults) in [place] often experience allergic reactions after eating bananas ripenedartificially with ethylene. In the [facility], food distribution centers treat unripe bananas and other produce with ethylene to ripen; not commonly done in [facility] since fruit is tree-ripened there. Does treatmentof food with ethylene induce banana proteins that cross-react with latex? (Sherie et al.    References:   Latex in Foods Allergy, http://ehp.niehs.nih.gov/members/2003/5811/5811.html    Search web for \" Whats in Season \" for whereyou live or are at the time you food shop  www.nutritioncouncil.org/pdf/healthy/SeasonalProduce. pdf ,   Management of Latex, http://medicalcenter. osu.edu/  search for latex

## 2019-07-30 NOTE — LETTER
[address] [place] [address],[address] [id number]  Phone: [phone]  Fax: [phone]    Олег Radford MD        July 30, 2019    Andreas Arechigaobey  [place] [address] [place] [id number]      Dear Mary Santos:    Here are some typical meals I have seen to be beneficial to others. Example Day 1   Breakfast: steamed sweet potato, touch of maple syrup, 4 slices of limon     Lunch: 4 slices Boar's Head Ham on ALTAF's white bread, mixed berries (raspberries, blueberries, blackberries), small kale salad with olive oil & balsamic vinegar as dressing topped with cucumbers, real limon crumbles     Dinner: grilled lean beef hamburger wrapped in lettuce, steamed green beans     Example day 2   Breakfast: Two pieces ALTAF's white bread toast, strawberry jam, 4 Komal pork sausage links     Lunch: canned tuna (packed in water) with a tsp of stanley/salt/pepper eaten on top of a bed of mixed greens with 100% olive oil potato chips, can of lite peaches     Dinner: oven baked salmon, brown rice, and steamed broccoli     Example day 3   Breakfast: 3 eggs cooked over medium served over a steamed sweet potato, side of blueberries     Lunch: [facility] salad (mixed greens, boar's head salami, pepperoni, boar's head ham, pepperoncinis/mild banana peppers, olives, red onion) served with olive oil and balsamic vinegar dressing, apple slices with omega 3 peanut butter     Dinner: spaghetti (rice pasta with beef meat sauce)     Example day 4   Breakfast: cream of rice, touch of maple syrup, 3 Komal pork sausage links     Lunch: Brussel sprouts with limon, side of fruit (pineapple & pears)     Dinner: beef fajitas (Cook down a red, yellow, green pepper, and an onion with some simple homemade taco seasonings, pan cook flank steak).  Optional: ALTAF's gluten free tortillas, Salsa, shredded lettuce, arvind sour cream     Example day 5

## 2019-08-01 ENCOUNTER — TELEPHONE (OUTPATIENT)
Dept: PSYCHIATRY | Age: 64
End: 2019-08-01

## 2019-08-01 NOTE — TELEPHONE ENCOUNTER
Magda Crandall called into the office stating that she is still facing anxiety everyday; she states that she is also experiencing \"weird\" thoughts all day. I asked her if they were racing thoughts; she said \"no, just weird thoughts. \"     She states that this week she is not currently working that she is on vacation; so she believes that her mind has more time to think and its been driving her insane; she states that it is a constant baez from when she wakes up until she falls asleep with her anxiety. She states that this morning; she woke up at 5am and could not get back to sleep. She states that she can not focus on anything as well. She states that the Prozac has been the only change since she has felt this way; she states that her anxiety is better since stopping the Buspar but its still there. She states that she is currently taking 20mg of Prozac daily; but she did not know if she should decrease to 10mg daily and see if that helps or what she should do. She states that she has been on antidepressants before and she has not had this much anxiety before. She states that she is just not herself. She states that if she was to decrease to 10mg per provider's suggestion; then she only has 1 pill left of the 10mg from before. She states that the 20mg that she has are capsules; so she is unable to cut them in half. I informed her that I would put a note into this provider to see what she suggests and return her call; she understood. Please advise. Patient is not scheduled to return until 09/03/19. Patient stopped into the office on Tuesday (07/30/19) this week as well and had the [employer] testing done; at this time; the results have not been finalized yet.

## 2019-08-01 NOTE — TELEPHONE ENCOUNTER
I have left her a detailed message regarding this provider's suggestions. I have also informed her that she has been placed on the cancellation list as well; in hopes to getting her in for a sooner appt. If she had any further questions or concerns to give the office a call.

## 2019-08-06 ENCOUNTER — TELEPHONE (OUTPATIENT)
Dept: PSYCHIATRY | Age: 64
End: 2019-08-06

## 2019-08-07 ENCOUNTER — OFFICE VISIT (OUTPATIENT)
Dept: PSYCHIATRY | Age: 64
End: 2019-08-07
Payer: COMMERCIAL

## 2019-08-07 DIAGNOSIS — F33.0 MILD EPISODE OF RECURRENT MAJOR DEPRESSIVE DISORDER (HCC): Primary | ICD-10-CM

## 2019-08-07 DIAGNOSIS — F41.1 GENERALIZED ANXIETY DISORDER: ICD-10-CM

## 2019-08-07 PROCEDURE — 99214 OFFICE O/P EST MOD 30 MIN: CPT | Performed by: NURSE PRACTITIONER

## 2019-08-07 PROCEDURE — 90836 PSYTX W PT W E/M 45 MIN: CPT | Performed by: NURSE PRACTITIONER

## 2019-08-07 RX ORDER — CLONAZEPAM 0.5 MG/1
0.5 TABLET ORAL 2 TIMES DAILY PRN
Qty: 60 TABLET | Refills: 0 | Status: SHIPPED | OUTPATIENT
Start: 2019-08-07 | End: 2019-12-20 | Stop reason: ALTCHOICE

## 2019-08-07 RX ORDER — AMLODIPINE BESYLATE 5 MG/1
5 TABLET ORAL DAILY
COMMUNITY

## 2019-08-07 RX ORDER — DESVENLAFAXINE 25 MG/1
25 TABLET, EXTENDED RELEASE ORAL DAILY
Qty: 30 TABLET | Refills: 1 | Status: SHIPPED | OUTPATIENT
Start: 2019-08-07 | End: 2019-09-03 | Stop reason: DRUGHIGH

## 2019-08-08 ENCOUNTER — TELEPHONE (OUTPATIENT)
Dept: PSYCHIATRY | Age: 64
End: 2019-08-08

## 2019-08-08 NOTE — TELEPHONE ENCOUNTER
Bre Madsen wrote into the office stating the following:      Víctor Lopez got in tomorrow with Connie Chiu from [place] @ 3 pm. They had a cancellation. Wally can't get the med prystiq until after 2 pm tomorrow. If I get it tomorrow should I wait til the next day to start in the am or take at supper tomorrow. I took a nap and feel better. I might take Benadryl tonight will see.     Dr Caputo Sit ok with treatment plan? Thanks   Fausto Olivas     Please advise.

## 2019-08-09 ENCOUNTER — TELEPHONE (OUTPATIENT)
Dept: PSYCHIATRY | Age: 64
End: 2019-08-09

## 2019-08-13 ENCOUNTER — OFFICE VISIT (OUTPATIENT)
Dept: PSYCHIATRY | Age: 64
End: 2019-08-13

## 2019-08-13 DIAGNOSIS — F41.1 GENERALIZED ANXIETY DISORDER: ICD-10-CM

## 2019-08-13 DIAGNOSIS — F33.0 MILD EPISODE OF RECURRENT MAJOR DEPRESSIVE DISORDER (HCC): Primary | ICD-10-CM

## 2019-08-13 PROCEDURE — 99214 OFFICE O/P EST MOD 30 MIN: CPT | Performed by: NURSE PRACTITIONER

## 2019-08-13 NOTE — PROGRESS NOTES
[address] [place] [address]  [address]  Dept: [phone]  Dept Fax: [phone]  Loc: [phone]    Visit Date: 2019    SUBJECTIVE DATA     CHIEF COMPLAINT:    Chief Complaint   Patient presents with    Anxiety    Depression    Follow-up       History obtained from: patient    HISTORY OF PRESENT ILLNESS:    Arabella York is a 61 y.o. female who presents to the office for follow-up on depression and anxiety. States she is doing better  -Mornings are the roughest  -Feels like she cannot be rushed  -Will do meditation a couple of times in the morning  -Has found a new yoga riya and found it helpful    Not sleeping well  -feels like she is in a light sleep  -will sleep for a couple of hours and then wakes and has difficulty returning to sleep  -using Benadryl    When overloaded or overstimulated then gets tired and \"it is my body's way of saying slow down a little bit. \"    Has gotten a massage to help with symptoms; found it to be benficial  Using some essential oils and \"that has helped\"  -using lavender    Since last visit she went to the [address] [place] [address] - used ear plugs for the sirens - had an enjoyable time   -Neel Solanonel out with some friends and able to recognize when she was ready to go home    States she continues to be Norrine Dingwall over anything new or any changes    Seeing counselor every week - seeing Ramsey Devlin  -next appointment is Friday  -Has been calling [facility] daily in the evening    This past  was 39 years ago her mother   -states \"I was okay with it\"  -mother was age 55 and patient was age 21  -doesn't believe she has ever processed her mother's death    States she has been staying off Facebook  -\"I need to make my time more quality time. \"    Aug 21st school starts and she is supposed to go back on Aug 19th  -Work cancelled the evening class for her, but she will continue the morning class  -States she was HISTORY:    Past Medical History:   Diagnosis Date    Anxiety     Chronic kidney disease     Depression     Diverticulosis     EBV infection     Esophageal dilatation 2016    Fractured rib 2016    GERD (gastroesophageal reflux disease)     Heart murmur     Hiatal hernia     Hyperlipidemia     Hypertension        PAST SURGICAL HISTORY:    Past Surgical History:   Procedure Laterality Date    BLADDER SURGERY  2006    CHOLECYSTECTOMY      COLONOSCOPY  2012 2012 and 2019    GASTRIC FUNDOPLICATION  77/07/2139    GASTROPLASTY  06/28/2017    HYSTERECTOMY  2005    TONSILLECTOMY AND ADENOIDECTOMY  1994    UPPER GASTROINTESTINAL ENDOSCOPY  2016    with dilation       PREVIOUSMEDICATIONS:  Outpatient Medications Prior to Visit   Medication Sig Dispense Refill    amLODIPine (NORVASC) 5 MG tablet Take 5 mg by mouth daily      desvenlafaxine succinate (PRISTIQ) 25 MG TB24 extended release tablet Take 1 tablet by mouth daily 30 tablet 1    clonazePAM (KLONOPIN) 0.5 MG tablet Take 1 tablet by mouth 2 times daily as needed for Anxiety for up to 30 days.  60 tablet 0    ESTRING 2 MG vaginal ring INSERT 1 RING BY VAGINAL ROUTE Q 90 DAYS  3    Omega-3 Fatty Acids (FISH OIL) 1000 MG CAPS Take 1,000 mg by mouth 4 times daily      Probiotic Product (PROBIOTIC ADVANCED PO) Take 1 capsule by mouth 2 times daily      promethazine (PHENERGAN) 25 MG tablet as needed      aspirin 81 MG tablet Take 81 mg by mouth daily as needed       cyanocobalamin 1000 MCG tablet Take 2,500 mcg by mouth daily      magnesium (MAGNESIUM-OXIDE) 250 MG TABS tablet Take 250 mg by mouth 6 times daily If loose, switch some or all to Slow Mag mag chloride      oxybutynin (DITROPAN-XL) 10 MG extended release tablet Take 10 mg by mouth daily      fluticasone (FLONASE) 50 MCG/ACT nasal spray 2 sprays by Each Nare route daily      ondansetron (ZOFRAN) 4 MG tablet Take 4 mg by mouth every 8 hours as needed for Nausea or Vomiting      recurrent major depressive disorder ([facility] [address]    2. Generalized anxiety disorder        PLAN   Follow-up:  Return in about 4 weeks (around 9/10/2019), or if symptoms worsen or fail to improve, for follow-up and medication management. Prescriptions for this encounter:  New Prescriptions    No medications on file       No orders of the defined types were placed in this encounter. There are no discontinued medications. Additional orders:  No orders of the defined types were placed in this encounter. Patient will continue her current medications without changes. She will be given additional time off work with return date set for Aug. 22, 2019. CBT provided. Goals  Of treatment reviewed. Discussed coping strategies. Patient is using several positive coping strategies and is encouraged to continue utilizing these skills/methods. Patient is encouraged to participate in individual psychotherapy. Patient will be given work slip for short period of time to allow her to start new medication and begin counseling. Provided patient with CBT; discussed health boundaries; discussed methods to manage anxiety symptoms. Risks, potential side effects, possibledrug-drug interactions, benefits and alternate treatments discussed in detail. All questions answered. Patient stated understanding and is agreeable to treatment plan. Patient has been instructed to seek emergency help via the emergency and/or calling 911 should symptoms become severe, worsen, or with other concerning symptoms. Patient instructed to goimmediately to the emergency room and/or call 911 with any suicidal or homicidal ideations or if audio/visual hallucinations develop  Patient stated understanding and agrees. Patient given crisis center information. I spent a total of 30 minutes with the patient and over half of that time was spent on counseling and coordination of care regarding topics discussed above.     Provider Signature:  Electronically

## 2019-09-03 ENCOUNTER — OFFICE VISIT (OUTPATIENT)
Dept: PSYCHIATRY | Age: 64
End: 2019-09-03
Payer: COMMERCIAL

## 2019-09-03 DIAGNOSIS — F41.1 GENERALIZED ANXIETY DISORDER: ICD-10-CM

## 2019-09-03 DIAGNOSIS — F33.0 MILD EPISODE OF RECURRENT MAJOR DEPRESSIVE DISORDER (HCC): Primary | ICD-10-CM

## 2019-09-03 PROCEDURE — 99214 OFFICE O/P EST MOD 30 MIN: CPT | Performed by: NURSE PRACTITIONER

## 2019-09-03 RX ORDER — DESVENLAFAXINE 50 MG/1
50 TABLET, EXTENDED RELEASE ORAL DAILY
Qty: 30 TABLET | Refills: 1 | Status: SHIPPED | OUTPATIENT
Start: 2019-09-03 | End: 2019-10-10 | Stop reason: SDUPTHER

## 2019-09-03 NOTE — PROGRESS NOTES
[address] [place] [address]  [address]  Dept: [phone]  Dept Fax: [phone]  Loc: [phone]    Visit Date: 9/3/2019    SUBJECTIVE DATA     CHIEF COMPLAINT:    Chief Complaint   Patient presents with    Anxiety    Depression    Follow-up       History obtained from: patient    HISTORY OF PRESENT ILLNESS:    Jono Quintana is a 61 y.o. female who presents to the office for follow-up on depression and anxiety. States she is \"good. \"  Reports she continues to have some disruptive and bothersome anxiety and excessive worry. States she is doing better with controlling the worry and anxiety but is still very bothersome. States she would like to try an increase in the Pristiq to see if that helps control symptoms better. Reports she is now back to work. States the transition returning to work went very well. 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 States her job is going very well at this time. 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 Patient states she and her  went to a wedding this past weekend and had an enjoyable time. She states she did very well at the reception and was able to sleep well at the hotel. She reports she has been better able to express her thoughts and emotions and discuss difficult topics with her . She believes she is processing situations better. Patient states she is sleeping well overall, but does find that she will wake once or twice per night on occasion. She has been doing guided meditation's, guided deep breathing exercises, and yoga on a daily basis which has helped regulate her mood. Denies suicidal ideations, intent, plan. No homicidal ideations, intent, plan.  No audiovisual

## 2019-10-10 ENCOUNTER — OFFICE VISIT (OUTPATIENT)
Dept: PSYCHIATRY | Age: 64
End: 2019-10-10
Payer: COMMERCIAL

## 2019-10-10 DIAGNOSIS — F41.1 GENERALIZED ANXIETY DISORDER: ICD-10-CM

## 2019-10-10 DIAGNOSIS — G47.00 INSOMNIA, UNSPECIFIED TYPE: ICD-10-CM

## 2019-10-10 DIAGNOSIS — F33.0 MILD EPISODE OF RECURRENT MAJOR DEPRESSIVE DISORDER (HCC): Primary | ICD-10-CM

## 2019-10-10 PROCEDURE — 99214 OFFICE O/P EST MOD 30 MIN: CPT | Performed by: NURSE PRACTITIONER

## 2019-10-10 RX ORDER — DESVENLAFAXINE 50 MG/1
50 TABLET, EXTENDED RELEASE ORAL DAILY
Qty: 30 TABLET | Refills: 2 | Status: SHIPPED | OUTPATIENT
Start: 2019-10-10 | End: 2019-12-20 | Stop reason: SDUPTHER

## 2019-10-10 RX ORDER — TRAZODONE HYDROCHLORIDE 50 MG/1
25-50 TABLET ORAL NIGHTLY
Qty: 30 TABLET | Refills: 2 | Status: SHIPPED | OUTPATIENT
Start: 2019-10-10 | End: 2019-12-20 | Stop reason: SDUPTHER

## 2019-11-05 ENCOUNTER — TELEPHONE (OUTPATIENT)
Dept: PSYCHIATRY | Age: 64
End: 2019-11-05

## 2019-11-28 LAB
ABSOLUTE BASO #: 0 X10E9/L (ref 0–0.9)
ABSOLUTE EOS #: 0 X10E9/L (ref 0–0.4)
ABSOLUTE LYMPH #: 1.5 X10E9/L (ref 1–3.5)
ABSOLUTE MONO #: 0.4 X10E9/L (ref 0–0.9)
ABSOLUTE NEUT #: 5 X10E9/L (ref 1.5–6.6)
AVERAGE GLUCOSE: 117 MG/DL
BASOPHILS RELATIVE PERCENT: 0.5 %
CALCIUM SERPL-MCNC: 9.8 MG/DL (ref 8.5–10.5)
DHEAS (DHEA SULFATE): 32 UG/DL (ref 12–133)
EOSINOPHILS RELATIVE PERCENT: 0.6 %
ESTRADIOL LEVEL: <15 PG/ML
HBA1C MFR BLD: 5.7 % (ref 4.4–6.4)
HCT VFR BLD CALC: 34.5 % (ref 35–47)
HEMOGLOBIN: 12 G/DL (ref 11.7–16)
HIGH SENSITIVE C-REACTIVE PROTEIN: 0.06 MG/DL (ref 0–0.74)
HOMOCYSTINE, SERUM: 12.32 MCMOL/L (ref 3.36–20.44)
LYMPHOCYTE %: 21.1 %
MAGNESIUM: 2.1 MG/DL (ref 1.8–2.6)
MCH RBC QN AUTO: 30.8 PG (ref 26–33.5)
MCHC RBC AUTO-ENTMCNC: 34.6 G/DL (ref 32–36)
MCV RBC AUTO: 89 FL (ref 81–100)
MONOCYTES # BLD: 6.4 %
NEUTROPHILS RELATIVE PERCENT: 71.4 %
PARATHYROID HORMONE INTACT: 16 PG/ML (ref 12–88)
PDW BLD-RTO: 12.8 % (ref 11.5–14.7)
PLATELETS: 298 X10E9/L (ref 150–450)
PMV BLD AUTO: 9 FL (ref 7–12)
PROGESTERONE LEVEL: 0.2 NG/ML
RBC: 3.88 X10E12/L (ref 3.8–5.2)
SEDIMENTATION RATE, ERYTHROCYTE: 19 MM/H (ref 0–30)
VITAMIN D 25-HYDROXY: 69.3 NG/ML (ref 30–100)
WBC: 7 X10E9/L (ref 4–11.8)

## 2019-11-29 LAB
FOLLICLE STIMULATING HORMONE: 82.6 MIU/ML
LH: 28.5 MIU/ML

## 2019-12-02 LAB
DEHYDROEPIANDROSTERONE: 0.3 NG/ML (ref 1.3–9.8)
GLIADIN ANTIBODIES IGA: 5.5 U/ML
GLIADIN ANTIBODIES IGG: 2.9 U/ML

## 2019-12-03 LAB
ALBUMIN SERUM: 4.5 G/DL (ref 3.6–5.1)
SEX HORMONE BINDING GLOBULIN: 63.4 NMOL/L (ref 17.3–125)
TESTOSTERONE FREE: 4 PG/ML (ref 0.6–3.8)
TESTOSTERONE, LCMS: 35 NG/DL (ref 5–32)

## 2019-12-20 ENCOUNTER — OFFICE VISIT (OUTPATIENT)
Dept: PSYCHIATRY | Age: 64
End: 2019-12-20
Payer: COMMERCIAL

## 2019-12-20 DIAGNOSIS — F33.0 MILD EPISODE OF RECURRENT MAJOR DEPRESSIVE DISORDER (HCC): Primary | ICD-10-CM

## 2019-12-20 DIAGNOSIS — F41.1 GENERALIZED ANXIETY DISORDER: ICD-10-CM

## 2019-12-20 PROCEDURE — 90833 PSYTX W PT W E/M 30 MIN: CPT | Performed by: NURSE PRACTITIONER

## 2019-12-20 PROCEDURE — 99214 OFFICE O/P EST MOD 30 MIN: CPT | Performed by: NURSE PRACTITIONER

## 2019-12-20 RX ORDER — DESVENLAFAXINE 50 MG/1
50 TABLET, EXTENDED RELEASE ORAL DAILY
Qty: 30 TABLET | Refills: 1 | Status: SHIPPED | OUTPATIENT
Start: 2019-12-20 | End: 2020-01-24 | Stop reason: DRUGHIGH

## 2019-12-20 RX ORDER — GLUCOSAMINE/CHONDR SU A SOD 750-600 MG
1 TABLET ORAL DAILY
COMMUNITY

## 2019-12-20 RX ORDER — CYCLOSPORINE 0.5 MG/ML
1 EMULSION OPHTHALMIC 2 TIMES DAILY
COMMUNITY
Start: 2019-12-06

## 2019-12-20 RX ORDER — DESVENLAFAXINE 25 MG/1
25 TABLET, EXTENDED RELEASE ORAL DAILY
Qty: 30 TABLET | Refills: 1 | Status: SHIPPED | OUTPATIENT
Start: 2019-12-20 | End: 2020-01-24 | Stop reason: DRUGHIGH

## 2019-12-20 RX ORDER — SULFAMETHOXAZOLE AND TRIMETHOPRIM 800; 160 MG/1; MG/1
1 TABLET ORAL 2 TIMES DAILY
COMMUNITY
Start: 2019-12-16 | End: 2020-01-24 | Stop reason: ALTCHOICE

## 2019-12-20 RX ORDER — TRAZODONE HYDROCHLORIDE 50 MG/1
25-50 TABLET ORAL NIGHTLY
Qty: 30 TABLET | Refills: 1 | Status: SHIPPED | OUTPATIENT
Start: 2019-12-20

## 2020-01-24 ENCOUNTER — OFFICE VISIT (OUTPATIENT)
Dept: PSYCHIATRY | Age: 65
End: 2020-01-24
Payer: COMMERCIAL

## 2020-01-24 ENCOUNTER — TELEPHONE (OUTPATIENT)
Dept: PSYCHIATRY | Age: 65
End: 2020-01-24

## 2020-01-24 PROCEDURE — 99214 OFFICE O/P EST MOD 30 MIN: CPT | Performed by: NURSE PRACTITIONER

## 2020-01-24 RX ORDER — DESVENLAFAXINE 100 MG/1
100 TABLET, EXTENDED RELEASE ORAL DAILY
Qty: 30 TABLET | Refills: 1 | Status: SHIPPED | OUTPATIENT
Start: 2020-01-24

## 2020-01-24 RX ORDER — DESVENLAFAXINE 100 MG/1
100 TABLET, EXTENDED RELEASE ORAL DAILY
Qty: 30 TABLET | Refills: 1 | Status: SHIPPED | OUTPATIENT
Start: 2020-01-24 | End: 2020-01-24 | Stop reason: SDUPTHER

## 2020-01-24 NOTE — PROGRESS NOTES
[address] [place] [address]  Brandon Alc[address]  Dept: [phone]  Dept Fax: [phone]  Loc: [phone]    Visit Date: 1/24/2020    SUBJECTIVE DATA     CHIEF COMPLAINT:    Chief Complaint   Patient presents with    Anxiety    Follow-up       History obtained from: patient    HISTORY OF PRESENT ILLNESS:    Jamari Dunn is a 59 y.o. female who presents to the office for follow-up on depression and anxiety. Last appointment was 12/20/2020. Patient states \"I'm having a lot of problems with my eyes\"  -has upcoming appointment next week  -states the light sensitivity began after her eye doctor appointment in Sept. when the optometrist told her she had small tear ducts. States then she began having worsening light sensitivity. So has been seeing [facility] eye clinic.  States she had corneal scratches that are deep, which is contributing to the light sensitivity  -states the eye issues are contributing to increased anxiety  -states the eye problem is making it difficult to work because the fluorescent lights make her eye discomfort worse  -states symptoms were better when she was off work for winter break  -states severity of symptoms fluctuates depending on what she is doing    Has had some intermittent difficulty maintaining sleep so retried the Trazodone, which has helped well  -tolerating the Trazodone well  -states it is difficult to return to sleep if she wakes, but with the Trazodone this is less problematic    Still attending counseling, which is beneficial  -[facility] [place] [facility]    Patient states she is still feeling anxious  -states she is worrying a lot  -admits she \"thinks crazy worry thoughts sometimes\"  -states it isn't as bad as it has been in the past but she is noticing it is worsening    Admits some stressors in the family  -states there are times when she feels left out by her family/children    Denies suicidal ideations, intent, plan. No homicidal ideations, intent, plan. No audiovisual hallucinations. HPI    Adverse reactions from psychotropic medications:  None      Current Psychiatric Review of Systems         Blanca or Hypomania:  no     Panic Attacks:  no     Phobias:  no     Obsessions and Compulsions:  no     Body or Vocal Tics:  no     Hallucinations:  no     Delusions:  no    SOCIAL HISTORY:  Patient was born in [place] and raised by her biological parents      Social History     Socioeconomic History    Marital status:      Spouse name: José Miguel Astorga Number of children: Not on file    Years of education: Not on file    Highest education level: Bachelor's degree (e.g., BA, AB, BS)   Occupational History    Not on file   Social Needs    Financial resource strain: Not very hard    Food insecurity:     Worry: Never true     Inability: Never true   [employer] needs:     Medical: No     Non-medical: No   Tobacco Use    Smoking status: Never Smoker    Smokeless tobacco: Never Used   Substance and Sexual Activity    Alcohol use: Yes     Comment: occasional    Drug use: No    Sexual activity: Yes     Partners: Male   Lifestyle    Physical activity:     Days per week: 0 days     Minutes per session: Not on file    Stress:  To some extent   Relationships    Social connections:     Talks on phone: More than three times a week     Gets together: More than three times a week     Attends Taoism service: More than 4 times per year     Active member of club or organization: Yes     Attends meetings of clubs or organizations: More than 4 times per year     Relationship status:     Intimate partner violence:     Fear of current or ex partner: No     Emotionally abused: No     Physically abused: No     Forced sexual activity: No   Other Topics Concern    Not on file   Social History Narrative    5/16/2019    LEVEL OF EDUCATION: graduated high school; 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; licensed as a RN in the  COLONOSCOPY  2012 2012 and 2019    GASTRIC FUNDOPLICATION  86/23/3434    GASTROPLASTY  06/28/2017    HYSTERECTOMY  2005    TONSILLECTOMY AND ADENOIDECTOMY  1994    UPPER GASTROINTESTINAL ENDOSCOPY  2016    with dilation       PREVIOUSMEDICATIONS:  Outpatient Medications Prior to Visit   Medication Sig Dispense Refill    cycloSPORINE (RESTASIS) 0.05 % ophthalmic emulsion Apply 1 drop to eye 2 times daily      Lutein-Zeaxanthin 25-5 MG CAPS Take 1 tablet by mouth daily      traZODone (DESYREL) 50 MG tablet Take 0.5-1 tablets by mouth nightly 30 tablet 1    amLODIPine (NORVASC) 5 MG tablet Take 5 mg by mouth daily      Omega-3 Fatty Acids (FISH OIL) 1000 MG CAPS Take 1,000 mg by mouth 4 times daily      Probiotic Product (PROBIOTIC ADVANCED PO) Take 1 capsule by mouth 2 times daily      promethazine (PHENERGAN) 25 MG tablet as needed      aspirin 81 MG tablet Take 81 mg by mouth daily as needed       cyanocobalamin 1000 MCG tablet Take 2,500 mcg by mouth daily      magnesium (MAGNESIUM-OXIDE) 250 MG TABS tablet Take 250 mg by mouth 6 times daily If loose, switch some or all to Slow Mag mag chloride      oxybutynin (DITROPAN-XL) 10 MG extended release tablet Take 10 mg by mouth daily      fluticasone (FLONASE) 50 MCG/ACT nasal spray 2 sprays by Each Nare route daily      ondansetron (ZOFRAN) 4 MG tablet Take 4 mg by mouth every 8 hours as needed for Nausea or Vomiting      Cholecalciferol (VITAMIN D3) 2000 UNITS CAPS Take 2,000 Units by mouth daily      olmesartan-hydrochlorothiazide (BENICAR HCT) 40-25 MG per tablet Take 1 tablet by mouth daily      pantoprazole (PROTONIX) 40 MG tablet Take 40 mg by mouth every morning (before breakfast)       celecoxib (CELEBREX) 200 MG capsule Take 200 mg by mouth daily      fenofibrate (TRICOR) 145 MG tablet Take 145 mg by mouth daily       pravastatin (PRAVACHOL) 80 MG tablet Take 80 mg by mouth daily      ranitidine (ZANTAC) 150 MG tablet Take 150 patient regarding topics above. Utilize CBT, reflective listening, and psychoeducation to assist patient in processing stressors as noted above. The remainder of the session was spent on symptom evaluation and medication management. Risks, potential side effects, possibledrug-drug interactions, benefits and alternate treatments discussed in detail. All questions answered. Patient stated understanding and is agreeable to treatment plan. Patient has been instructed to seek emergency help via the emergency and/or calling 911 should symptoms become severe, worsen, or with other concerning symptoms. Patient instructed to goimmediately to the emergency room and/or call 911 with any suicidal or homicidal ideations or if audio/visual hallucinations develop  Patient stated understanding and agrees. Patient given crisis center information. Provider Signature:  Electronically signed by UMER Garcia CNP on 1/24/2020 at 1:03 PM    **This report has been created using voice recognition software. It may contain minor errors which are inherent in voice recognition technology. **

## 2020-01-24 NOTE — TELEPHONE ENCOUNTER
Wally in [facility] was contacted and the script for Pristiq 100mg with the incorrect script was cancelled.

## 2023-02-20 LAB
ALBUMIN SERPL-MCNC: 4.5 G/DL
ALP BLD-CCNC: 47 U/L
ALT SERPL-CCNC: 14 U/L
ANION GAP SERPL CALCULATED.3IONS-SCNC: 9 MMOL/L
AST SERPL-CCNC: 22 U/L
BASOPHILS ABSOLUTE: 0 /ΜL
BASOPHILS RELATIVE PERCENT: 0.2 %
BILIRUB SERPL-MCNC: NORMAL MG/DL
BILIRUBIN, URINE: NORMAL
BLOOD, URINE: NORMAL
BUN BLDV-MCNC: 22 MG/DL
CALCIUM SERPL-MCNC: 9.6 MG/DL
CHLORIDE BLD-SCNC: 101 MMOL/L
CLARITY: NORMAL
CO2: 29 MMOL/L
COLOR: NORMAL
CREAT SERPL-MCNC: 1.1 MG/DL
EGFR: 52
EOSINOPHILS ABSOLUTE: 0.1 /ΜL
EOSINOPHILS RELATIVE PERCENT: 0.9 %
GLUCOSE BLD-MCNC: 104 MG/DL
GLUCOSE URINE: NORMAL
HCT VFR BLD CALC: 35.2 % (ref 36–46)
HEMOGLOBIN: 11.8 G/DL (ref 12–16)
KETONES, URINE: NORMAL
LEUKOCYTE ESTERASE, URINE: NORMAL
LYMPHOCYTES ABSOLUTE: 1.8 /ΜL
LYMPHOCYTES RELATIVE PERCENT: 14.4 %
MCH RBC QN AUTO: 30.4 PG
MCHC RBC AUTO-ENTMCNC: 33.5 G/DL
MCV RBC AUTO: 90.7 FL
MONOCYTES ABSOLUTE: 0.8 /ΜL
MONOCYTES RELATIVE PERCENT: 6.1 %
NEUTROPHILS ABSOLUTE: 9.6 /ΜL
NEUTROPHILS RELATIVE PERCENT: 78.2 %
NITRITE, URINE: NORMAL
PDW BLD-RTO: ABNORMAL %
PH UA: NORMAL
PLATELET # BLD: 245 K/ΜL
PMV BLD AUTO: 10.3 FL
POTASSIUM SERPL-SCNC: 4.2 MMOL/L
PROTEIN UA: NORMAL
RBC # BLD: 3.88 10^6/ΜL
SODIUM BLD-SCNC: 139 MMOL/L
SPECIFIC GRAVITY, URINE: NORMAL
TOTAL PROTEIN: 7.2
UROBILINOGEN, URINE: NORMAL
WBC # BLD: 12.3 10^3/ML

## 2023-02-21 ENCOUNTER — APPOINTMENT (OUTPATIENT)
Dept: ULTRASOUND IMAGING | Age: 68
DRG: 660 | End: 2023-02-21
Attending: INTERNAL MEDICINE
Payer: MEDICARE

## 2023-02-21 ENCOUNTER — ANESTHESIA EVENT (OUTPATIENT)
Dept: OPERATING ROOM | Age: 68
DRG: 660 | End: 2023-02-21
Payer: MEDICARE

## 2023-02-21 ENCOUNTER — HOSPITAL ENCOUNTER (INPATIENT)
Age: 68
LOS: 1 days | Discharge: HOME | DRG: 660 | End: 2023-02-22
Attending: INTERNAL MEDICINE | Admitting: INTERNAL MEDICINE
Payer: MEDICARE

## 2023-02-21 ENCOUNTER — ANESTHESIA (OUTPATIENT)
Dept: OPERATING ROOM | Age: 68
DRG: 660 | End: 2023-02-21
Payer: MEDICARE

## 2023-02-21 PROBLEM — N20.0 KIDNEY STONE: Status: ACTIVE | Noted: 2023-02-21

## 2023-02-21 PROBLEM — N39.0 COMPLICATED UTI (URINARY TRACT INFECTION): Status: ACTIVE | Noted: 2023-02-21

## 2023-02-21 LAB
25(OH)D3 SERPL-MCNC: 30 NG/ML (ref 30–100)
ALBUMIN SERPL BCG-MCNC: 3.8 G/DL (ref 3.5–5.1)
ALP SERPL-CCNC: 44 U/L (ref 38–126)
ALT SERPL W/O P-5'-P-CCNC: 24 U/L (ref 11–66)
ANION GAP SERPL CALC-SCNC: 9 MEQ/L (ref 8–16)
AST SERPL-CCNC: 45 U/L (ref 5–40)
BACTERIA URNS QL MICRO: ABNORMAL /HPF
BASOPHILS ABSOLUTE: 0 THOU/MM3 (ref 0–0.1)
BASOPHILS NFR BLD AUTO: 0.2 %
BILIRUB SERPL-MCNC: 0.4 MG/DL (ref 0.3–1.2)
BILIRUB UR QL STRIP.AUTO: NEGATIVE
BUN SERPL-MCNC: 16 MG/DL (ref 7–22)
CALCIUM SERPL-MCNC: 9.2 MG/DL (ref 8.5–10.5)
CASTS #/AREA URNS LPF: ABNORMAL /LPF
CASTS 2: ABNORMAL /LPF
CHARACTER UR: CLEAR
CHLORIDE SERPL-SCNC: 105 MEQ/L (ref 98–111)
CHOLEST SERPL-MCNC: 120 MG/DL (ref 100–199)
CO2 SERPL-SCNC: 25 MEQ/L (ref 23–33)
COLOR: YELLOW
CREAT SERPL-MCNC: 0.9 MG/DL (ref 0.4–1.2)
CRYSTALS URNS MICRO: ABNORMAL
DEPRECATED RDW RBC AUTO: 43.1 FL (ref 35–45)
EKG ATRIAL RATE: 70 BPM
EKG P AXIS: 66 DEGREES
EKG P-R INTERVAL: 172 MS
EKG Q-T INTERVAL: 394 MS
EKG QRS DURATION: 98 MS
EKG QTC CALCULATION (BAZETT): 425 MS
EKG R AXIS: 62 DEGREES
EKG T AXIS: 51 DEGREES
EKG VENTRICULAR RATE: 70 BPM
EOSINOPHIL NFR BLD AUTO: 0.2 %
EOSINOPHILS ABSOLUTE: 0 THOU/MM3 (ref 0–0.4)
EPITHELIAL CELLS, UA: ABNORMAL /HPF
ERYTHROCYTE [DISTWIDTH] IN BLOOD BY AUTOMATED COUNT: 12.4 % (ref 11.5–14.5)
FOLATE SERPL-MCNC: 18.9 NG/ML (ref 4.8–24.2)
GFR SERPL CREATININE-BSD FRML MDRD: > 60 ML/MIN/1.73M2
GLUCOSE SERPL-MCNC: 102 MG/DL (ref 70–108)
GLUCOSE UR QL STRIP.AUTO: 100 MG/DL
HCT VFR BLD AUTO: 33.9 % (ref 37–47)
HDLC SERPL-MCNC: 54 MG/DL
HGB BLD-MCNC: 10.6 GM/DL (ref 12–16)
HGB UR QL STRIP.AUTO: ABNORMAL
IMM GRANULOCYTES # BLD AUTO: 0.02 THOU/MM3 (ref 0–0.07)
IMM GRANULOCYTES NFR BLD AUTO: 0.2 %
KETONES UR QL STRIP.AUTO: NEGATIVE
LDLC SERPL CALC-MCNC: 58 MG/DL
LYMPHOCYTES ABSOLUTE: 0.4 THOU/MM3 (ref 1–4.8)
LYMPHOCYTES NFR BLD AUTO: 5.1 %
MCH RBC QN AUTO: 29.9 PG (ref 26–33)
MCHC RBC AUTO-ENTMCNC: 31.3 GM/DL (ref 32.2–35.5)
MCV RBC AUTO: 95.5 FL (ref 81–99)
MISCELLANEOUS 2: ABNORMAL
MONOCYTES ABSOLUTE: 0.5 THOU/MM3 (ref 0.4–1.3)
MONOCYTES NFR BLD AUTO: 5.5 %
NEUTROPHILS NFR BLD AUTO: 88.8 %
NITRITE UR QL STRIP: NEGATIVE
NRBC BLD AUTO-RTO: 0 /100 WBC
PH UR STRIP.AUTO: 7 [PH] (ref 5–9)
PLATELET # BLD AUTO: 228 THOU/MM3 (ref 130–400)
PMV BLD AUTO: 10.1 FL (ref 9.4–12.4)
POTASSIUM SERPL-SCNC: 3.8 MEQ/L (ref 3.5–5.2)
PROT SERPL-MCNC: 6.3 G/DL (ref 6.1–8)
PROT UR STRIP.AUTO-MCNC: ABNORMAL MG/DL
RBC # BLD AUTO: 3.55 MILL/MM3 (ref 4.2–5.4)
RBC URINE: > 200 /HPF
RENAL EPI CELLS #/AREA URNS HPF: ABNORMAL /[HPF]
SEGMENTED NEUTROPHILS ABSOLUTE COUNT: 7.7 THOU/MM3 (ref 1.8–7.7)
SODIUM SERPL-SCNC: 139 MEQ/L (ref 135–145)
SP GR UR REFRACT.AUTO: 1.01 (ref 1–1.03)
TRIGL SERPL-MCNC: 41 MG/DL (ref 0–199)
TSH SERPL DL<=0.005 MIU/L-ACNC: 1.05 UIU/ML (ref 0.4–4.2)
URATE SERPL-MCNC: 3.7 MG/DL (ref 2.4–5.7)
UROBILINOGEN, URINE: 0.2 EU/DL (ref 0–1)
VIT B12 SERPL-MCNC: 1086 PG/ML (ref 211–911)
WBC # BLD AUTO: 8.7 THOU/MM3 (ref 4.8–10.8)
WBC #/AREA URNS HPF: ABNORMAL /HPF
WBC #/AREA URNS HPF: ABNORMAL /[HPF]
YEAST LIKE FUNGI URNS QL MICRO: ABNORMAL

## 2023-02-21 PROCEDURE — 2709999900 HC NON-CHARGEABLE SUPPLY: Performed by: UROLOGY

## 2023-02-21 PROCEDURE — 93010 ELECTROCARDIOGRAM REPORT: CPT | Performed by: NUCLEAR MEDICINE

## 2023-02-21 PROCEDURE — 84443 ASSAY THYROID STIM HORMONE: CPT

## 2023-02-21 PROCEDURE — 82306 VITAMIN D 25 HYDROXY: CPT

## 2023-02-21 PROCEDURE — 6370000000 HC RX 637 (ALT 250 FOR IP): Performed by: INTERNAL MEDICINE

## 2023-02-21 PROCEDURE — 6360000002 HC RX W HCPCS: Performed by: STUDENT IN AN ORGANIZED HEALTH CARE EDUCATION/TRAINING PROGRAM

## 2023-02-21 PROCEDURE — 2580000003 HC RX 258: Performed by: STUDENT IN AN ORGANIZED HEALTH CARE EDUCATION/TRAINING PROGRAM

## 2023-02-21 PROCEDURE — 3700000001 HC ADD 15 MINUTES (ANESTHESIA): Performed by: UROLOGY

## 2023-02-21 PROCEDURE — 82607 VITAMIN B-12: CPT

## 2023-02-21 PROCEDURE — 1200000000 HC SEMI PRIVATE

## 2023-02-21 PROCEDURE — 36415 COLL VENOUS BLD VENIPUNCTURE: CPT

## 2023-02-21 PROCEDURE — 81001 URINALYSIS AUTO W/SCOPE: CPT

## 2023-02-21 PROCEDURE — 99223 1ST HOSP IP/OBS HIGH 75: CPT | Performed by: INTERNAL MEDICINE

## 2023-02-21 PROCEDURE — 93005 ELECTROCARDIOGRAM TRACING: CPT | Performed by: STUDENT IN AN ORGANIZED HEALTH CARE EDUCATION/TRAINING PROGRAM

## 2023-02-21 PROCEDURE — 3600000012 HC SURGERY LEVEL 2 ADDTL 15MIN: Performed by: UROLOGY

## 2023-02-21 PROCEDURE — 87040 BLOOD CULTURE FOR BACTERIA: CPT

## 2023-02-21 PROCEDURE — 76770 US EXAM ABDO BACK WALL COMP: CPT

## 2023-02-21 PROCEDURE — 80061 LIPID PANEL: CPT

## 2023-02-21 PROCEDURE — 0T768DZ DILATION OF RIGHT URETER WITH INTRALUMINAL DEVICE, VIA NATURAL OR ARTIFICIAL OPENING ENDOSCOPIC: ICD-10-PCS | Performed by: UROLOGY

## 2023-02-21 PROCEDURE — 3600000002 HC SURGERY LEVEL 2 BASE: Performed by: UROLOGY

## 2023-02-21 PROCEDURE — 3700000000 HC ANESTHESIA ATTENDED CARE: Performed by: UROLOGY

## 2023-02-21 PROCEDURE — 85025 COMPLETE CBC W/AUTO DIFF WBC: CPT

## 2023-02-21 PROCEDURE — 82746 ASSAY OF FOLIC ACID SERUM: CPT

## 2023-02-21 PROCEDURE — C1769 GUIDE WIRE: HCPCS | Performed by: UROLOGY

## 2023-02-21 PROCEDURE — 6370000000 HC RX 637 (ALT 250 FOR IP): Performed by: STUDENT IN AN ORGANIZED HEALTH CARE EDUCATION/TRAINING PROGRAM

## 2023-02-21 PROCEDURE — C1758 CATHETER, URETERAL: HCPCS | Performed by: UROLOGY

## 2023-02-21 PROCEDURE — 80053 COMPREHEN METABOLIC PANEL: CPT

## 2023-02-21 PROCEDURE — C2617 STENT, NON-COR, TEM W/O DEL: HCPCS | Performed by: UROLOGY

## 2023-02-21 PROCEDURE — 84550 ASSAY OF BLOOD/URIC ACID: CPT

## 2023-02-21 PROCEDURE — 99222 1ST HOSP IP/OBS MODERATE 55: CPT | Performed by: UROLOGY

## 2023-02-21 PROCEDURE — 6360000002 HC RX W HCPCS: Performed by: NURSE ANESTHETIST, CERTIFIED REGISTERED

## 2023-02-21 DEVICE — URETERAL STENT
Type: IMPLANTABLE DEVICE | Site: URETER | Status: FUNCTIONAL
Brand: PERCUFLEX™ PLUS

## 2023-02-21 RX ORDER — OLMESARTAN MEDOXOMIL AND HYDROCHLOROTHIAZIDE 40/25 40; 25 MG/1; MG/1
1 TABLET ORAL DAILY
Status: DISCONTINUED | OUTPATIENT
Start: 2023-02-21 | End: 2023-02-21 | Stop reason: CLARIF

## 2023-02-21 RX ORDER — POLYVINYL ALCOHOL 14 MG/ML
2 SOLUTION/ DROPS OPHTHALMIC PRN
Status: DISCONTINUED | OUTPATIENT
Start: 2023-02-21 | End: 2023-02-22 | Stop reason: HOSPADM

## 2023-02-21 RX ORDER — LANOLIN ALCOHOL/MO/W.PET/CERES
2500 CREAM (GRAM) TOPICAL DAILY
Status: DISCONTINUED | OUTPATIENT
Start: 2023-02-21 | End: 2023-02-22 | Stop reason: HOSPADM

## 2023-02-21 RX ORDER — PANTOPRAZOLE SODIUM 40 MG/1
40 TABLET, DELAYED RELEASE ORAL
Status: DISCONTINUED | OUTPATIENT
Start: 2023-02-22 | End: 2023-02-22 | Stop reason: HOSPADM

## 2023-02-21 RX ORDER — VITAMIN B COMPLEX
2000 TABLET ORAL DAILY
Status: DISCONTINUED | OUTPATIENT
Start: 2023-02-21 | End: 2023-02-22 | Stop reason: HOSPADM

## 2023-02-21 RX ORDER — ONDANSETRON 2 MG/ML
4 INJECTION INTRAMUSCULAR; INTRAVENOUS EVERY 6 HOURS PRN
Status: DISCONTINUED | OUTPATIENT
Start: 2023-02-21 | End: 2023-02-22 | Stop reason: HOSPADM

## 2023-02-21 RX ORDER — SODIUM CHLORIDE 9 MG/ML
INJECTION, SOLUTION INTRAVENOUS PRN
Status: DISCONTINUED | OUTPATIENT
Start: 2023-02-21 | End: 2023-02-22 | Stop reason: HOSPADM

## 2023-02-21 RX ORDER — ONDANSETRON 4 MG/1
4 TABLET, ORALLY DISINTEGRATING ORAL EVERY 8 HOURS PRN
Status: DISCONTINUED | OUTPATIENT
Start: 2023-02-21 | End: 2023-02-22 | Stop reason: HOSPADM

## 2023-02-21 RX ORDER — ENOXAPARIN SODIUM 100 MG/ML
40 INJECTION SUBCUTANEOUS DAILY
Status: DISCONTINUED | OUTPATIENT
Start: 2023-02-21 | End: 2023-02-22 | Stop reason: HOSPADM

## 2023-02-21 RX ORDER — AMLODIPINE BESYLATE 5 MG/1
5 TABLET ORAL DAILY
Status: DISCONTINUED | OUTPATIENT
Start: 2023-02-22 | End: 2023-02-22 | Stop reason: HOSPADM

## 2023-02-21 RX ORDER — LOSARTAN POTASSIUM 100 MG/1
100 TABLET ORAL DAILY
Status: DISCONTINUED | OUTPATIENT
Start: 2023-02-21 | End: 2023-02-22 | Stop reason: HOSPADM

## 2023-02-21 RX ORDER — OXYBUTYNIN CHLORIDE 10 MG/1
10 TABLET, EXTENDED RELEASE ORAL DAILY
Status: DISCONTINUED | OUTPATIENT
Start: 2023-02-21 | End: 2023-02-22 | Stop reason: HOSPADM

## 2023-02-21 RX ORDER — DESVENLAFAXINE 100 MG/1
100 TABLET, EXTENDED RELEASE ORAL DAILY
Status: DISCONTINUED | OUTPATIENT
Start: 2023-02-21 | End: 2023-02-22 | Stop reason: HOSPADM

## 2023-02-21 RX ORDER — PRAVASTATIN SODIUM 80 MG/1
80 TABLET ORAL DAILY
Status: DISCONTINUED | OUTPATIENT
Start: 2023-02-21 | End: 2023-02-22 | Stop reason: HOSPADM

## 2023-02-21 RX ORDER — HYDROCHLOROTHIAZIDE 25 MG/1
25 TABLET ORAL DAILY
Status: DISCONTINUED | OUTPATIENT
Start: 2023-02-21 | End: 2023-02-22 | Stop reason: HOSPADM

## 2023-02-21 RX ORDER — ACETAMINOPHEN 650 MG/1
650 SUPPOSITORY RECTAL EVERY 6 HOURS PRN
Status: DISCONTINUED | OUTPATIENT
Start: 2023-02-21 | End: 2023-02-22 | Stop reason: HOSPADM

## 2023-02-21 RX ORDER — POLYETHYLENE GLYCOL 3350 17 G/17G
17 POWDER, FOR SOLUTION ORAL DAILY PRN
Status: DISCONTINUED | OUTPATIENT
Start: 2023-02-21 | End: 2023-02-22 | Stop reason: HOSPADM

## 2023-02-21 RX ORDER — SENNOSIDES 8.8 MG/5ML
5 LIQUID ORAL 2 TIMES DAILY PRN
Status: DISCONTINUED | OUTPATIENT
Start: 2023-02-21 | End: 2023-02-22 | Stop reason: HOSPADM

## 2023-02-21 RX ORDER — ACETAMINOPHEN 325 MG/1
650 TABLET ORAL EVERY 6 HOURS PRN
Status: DISCONTINUED | OUTPATIENT
Start: 2023-02-21 | End: 2023-02-22 | Stop reason: HOSPADM

## 2023-02-21 RX ORDER — CETIRIZINE HYDROCHLORIDE 10 MG/1
10 TABLET ORAL NIGHTLY
Status: DISCONTINUED | OUTPATIENT
Start: 2023-02-21 | End: 2023-02-22 | Stop reason: HOSPADM

## 2023-02-21 RX ORDER — PHENAZOPYRIDINE HYDROCHLORIDE 100 MG/1
100 TABLET, FILM COATED ORAL 3 TIMES DAILY PRN
Status: DISCONTINUED | OUTPATIENT
Start: 2023-02-21 | End: 2023-02-22 | Stop reason: HOSPADM

## 2023-02-21 RX ORDER — TAMSULOSIN HYDROCHLORIDE 0.4 MG/1
0.4 CAPSULE ORAL DAILY
Status: DISCONTINUED | OUTPATIENT
Start: 2023-02-21 | End: 2023-02-22 | Stop reason: HOSPADM

## 2023-02-21 RX ORDER — PROPOFOL 10 MG/ML
INJECTION, EMULSION INTRAVENOUS PRN
Status: DISCONTINUED | OUTPATIENT
Start: 2023-02-21 | End: 2023-02-21 | Stop reason: SDUPTHER

## 2023-02-21 RX ORDER — SODIUM CHLORIDE 0.9 % (FLUSH) 0.9 %
5-40 SYRINGE (ML) INJECTION PRN
Status: DISCONTINUED | OUTPATIENT
Start: 2023-02-21 | End: 2023-02-22 | Stop reason: HOSPADM

## 2023-02-21 RX ORDER — LANOLIN ALCOHOL/MO/W.PET/CERES
200 CREAM (GRAM) TOPICAL
Status: DISCONTINUED | OUTPATIENT
Start: 2023-02-21 | End: 2023-02-22 | Stop reason: HOSPADM

## 2023-02-21 RX ORDER — KETOROLAC TROMETHAMINE 30 MG/ML
15 INJECTION, SOLUTION INTRAMUSCULAR; INTRAVENOUS EVERY 6 HOURS PRN
Status: DISCONTINUED | OUTPATIENT
Start: 2023-02-21 | End: 2023-02-22 | Stop reason: HOSPADM

## 2023-02-21 RX ORDER — SODIUM CHLORIDE 0.9 % (FLUSH) 0.9 %
5-40 SYRINGE (ML) INJECTION EVERY 12 HOURS SCHEDULED
Status: DISCONTINUED | OUTPATIENT
Start: 2023-02-21 | End: 2023-02-22 | Stop reason: HOSPADM

## 2023-02-21 RX ADMIN — PHENAZOPYRIDINE 100 MG: 100 TABLET ORAL at 20:08

## 2023-02-21 RX ADMIN — Medication 60 MG: at 12:48

## 2023-02-21 RX ADMIN — KETOROLAC TROMETHAMINE 15 MG: 30 INJECTION, SOLUTION INTRAMUSCULAR; INTRAVENOUS at 20:08

## 2023-02-21 RX ADMIN — SODIUM CHLORIDE, PRESERVATIVE FREE 10 ML: 5 INJECTION INTRAVENOUS at 20:08

## 2023-02-21 RX ADMIN — PROPOFOL 300 MG: 10 INJECTION, EMULSION INTRAVENOUS at 12:48

## 2023-02-21 RX ADMIN — Medication 200 MG: at 22:15

## 2023-02-21 RX ADMIN — CEFTRIAXONE SODIUM 1000 MG: 1 INJECTION, POWDER, FOR SOLUTION INTRAMUSCULAR; INTRAVENOUS at 12:53

## 2023-02-21 RX ADMIN — Medication 400 MG: at 16:13

## 2023-02-21 RX ADMIN — ACETAMINOPHEN 650 MG: 325 TABLET ORAL at 16:16

## 2023-02-21 RX ADMIN — SODIUM CHLORIDE, PRESERVATIVE FREE 10 ML: 5 INJECTION INTRAVENOUS at 11:52

## 2023-02-21 RX ADMIN — DESVENLAFAXINE SUCCINATE 100 MG: 100 TABLET, FILM COATED, EXTENDED RELEASE ORAL at 20:26

## 2023-02-21 RX ADMIN — SODIUM CHLORIDE: 9 INJECTION, SOLUTION INTRAVENOUS at 12:45

## 2023-02-21 RX ADMIN — CETIRIZINE HYDROCHLORIDE 10 MG: 10 TABLET, FILM COATED ORAL at 20:08

## 2023-02-21 ASSESSMENT — PAIN DESCRIPTION - LOCATION: LOCATION: HEAD

## 2023-02-21 ASSESSMENT — PAIN SCALES - GENERAL: PAINLEVEL_OUTOF10: 6

## 2023-02-21 ASSESSMENT — PAIN DESCRIPTION - DESCRIPTORS: DESCRIPTORS: ACHING

## 2023-02-21 ASSESSMENT — PAIN DESCRIPTION - ORIENTATION: ORIENTATION: OTHER (COMMENT)

## 2023-02-21 ASSESSMENT — PAIN DESCRIPTION - ONSET: ONSET: GRADUAL

## 2023-02-21 ASSESSMENT — PAIN DESCRIPTION - PAIN TYPE: TYPE: ACUTE PAIN

## 2023-02-21 ASSESSMENT — PAIN - FUNCTIONAL ASSESSMENT: PAIN_FUNCTIONAL_ASSESSMENT: ACTIVITIES ARE NOT PREVENTED

## 2023-02-21 ASSESSMENT — PAIN DESCRIPTION - FREQUENCY: FREQUENCY: CONTINUOUS

## 2023-02-21 NOTE — ANESTHESIA POSTPROCEDURE EVALUATION
Department of Anesthesiology  Postprocedure Note    Patient: Soraida Blanchard  MRN: [id number]  YOB: 1955  Date of evaluation: 2/21/2023      Procedure Summary     Date: 02/21/23 Room / Location: [facility] / Mayra Bob    Anesthesia Start: 5168 Anesthesia Stop: 0478    Procedure: CYSTOSCOPY RIGHT URETERAL STENT INSERTION (Right) Diagnosis:       Ureteral stone      (Ureteral stone [N20.1])    Surgeons: Aida Nance MD Responsible Provider: Crow Nava DO    Anesthesia Type: MAC ASA Status: 2          Anesthesia Type: No value filed.     Manny Phase I:      Manny Phase II:        Anesthesia Post Evaluation    Patient location during evaluation: bedside  Patient participation: complete - patient participated  Level of consciousness: awake and alert  Airway patency: patent  Nausea & Vomiting: no nausea and no vomiting  Complications: no  Cardiovascular status: hemodynamically stable  Respiratory status: spontaneous ventilation and acceptable  Hydration status: euvolemic

## 2023-02-21 NOTE — OP NOTE
[address] [place] [address]. [place]    DATE: 2/21/2023  Patient:  Joel Turner  MRN: [id number]  YOB: 1955    SURGEON: Sheldon Meier MD.    ASSISTANT: none    PREOPERATIVE DIAGNOSIS:  right ureteral obstruction    POSTOPERATIVE DIAGNOSIS: right ureteral obstruction    PROCEDURE PERFORMED:  Cystoscopy, right ureteral stent placement    ANESTHESIA: Monitor Anesthesia Care    COMPLICATIONS: none    OR BLOOD LOSS:  Minimal    FLUIDS: Cystalloids per Anesthesia    SPECIMENS:  * No specimens in log *  none    DRAINS: 6 x 26 dbl j stent    INDICATIONS FOR PROCEDURE:  The patient is a 79 y.o. female who presents today with Ureteral stone [N20.1] here for CYSTOSCOPY RIGHT URETERAL STENT INSERTION. After risks, benefits and alternatives of the procedure were discussed with the patient, the patient elected to proceed. DETAILS OF PROCEDURE:  After informed consent was obtained in the preoperative area, the patient was taken back to the operating room and transferred to the operating table in supine position. Anesthesia was induced and antibiotics were given. The patient was placed in modified dorsal lithotomy position and sterilely prepped and draped in a standard fashion. A timeout occurred. Two patient identifiers were used. We entered the urethra with a 22 Western Tamara scope. The Right ureteral orifice was then visualized. . A guidewire was carefully advanced into the kidney and position was confirmed with xray. Under direct visualization the stent was advanced over the wire until it was in proper location. The Glidewire was then removed. A curl could be seen in the Right renal pelvis under using fluoroscopic vision, and in the bladder under direct visualization. there was efflux of urine through the stent noted. The patients bladder was drained. All instrumentation was removed.  The patient was then awakened and discharged back to the PACU in good and stable condition.

## 2023-02-21 NOTE — ANESTHESIA PRE PROCEDURE
Department of Anesthesiology  Preprocedure Note       Name:  Shaista Middleton   Age:  79 y.o.  :  1955                                          MRN:  [id number]         Date:  2023      Surgeon: Allen Asher):  Josette Correia MD    Procedure: Procedure(s):  CYSTOSCOPY RIGHT URETERAL STENT INSERTION    Medications prior to admission:   Prior to Admission medications    Medication Sig Start Date End Date Taking?  Authorizing Provider   desvenlafaxine succinate (PRISTIQ) 100 MG TB24 extended release tablet Take 1 tablet by mouth daily 20   UMER Ronquillo CNP   cycloSPORINE (RESTASIS) 0.05 % ophthalmic emulsion Apply 1 drop to eye 2 times daily 19   Historical Provider, MD   Lutein-Zeaxanthin 25-5 MG CAPS Take 1 tablet by mouth daily  Patient not taking: Reported on 2023    Historical Provider, MD   traZODone (DESYREL) 50 MG tablet Take 0.5-1 tablets by mouth nightly  Patient not taking: Reported on 19   UMER Moise CNP   amLODIPine (NORVASC) 5 MG tablet Take 5 mg by mouth daily    Historical Provider, MD   Omega-3 Fatty Acids (FISH OIL) 1000 MG CAPS Take 1,000 mg by mouth 4 times daily    Historical Provider, MD   Probiotic Product (PROBIOTIC ADVANCED PO) Take 1 capsule by mouth 2 times daily  Patient not taking: Reported on 2023    Historical Provider, MD   promethazine (PHENERGAN) 25 MG tablet as needed  Patient not taking: Reported on 2023 4/15/19   Historical Provider, MD   aspirin 81 MG tablet Take 81 mg by mouth daily as needed   Patient not taking: Reported on 2023    Historical Provider, MD   cyanocobalamin 1000 MCG tablet Take 2,500 mcg by mouth daily    Historical Provider, MD   magnesium (MAGNESIUM-OXIDE) 250 MG TABS tablet Take 250 mg by mouth 6 times daily If loose, switch some or all to Slow Mag mag chloride    Historical Provider, MD   oxybutynin (DITROPAN-XL) 10 MG extended release tablet Take 10 mg by mouth daily    Historical Provider, MD   fluticasone [facility]) 50 MCG/ACT nasal spray 2 sprays by Each Nare route daily  Patient not taking: Reported on 2/21/2023    Historical Provider, MD   ondansetron (ZOFRAN) 4 MG tablet Take 4 mg by mouth every 8 hours as needed for Nausea or Vomiting  Patient not taking: Reported on 2/21/2023    Historical Provider, MD   Cholecalciferol (VITAMIN D3) 2000 UNITS CAPS Take 2,000 Units by mouth daily    Historical Provider, MD   olmesartan-hydrochlorothiazide (BENICAR HCT) 40-25 MG per tablet Take 1 tablet by mouth daily    Historical Provider, MD   pantoprazole (PROTONIX) 40 MG tablet Take 40 mg by mouth every morning (before breakfast)     Historical Provider, MD   celecoxib (CELEBREX) 200 MG capsule Take 200 mg by mouth daily    Historical Provider, MD   fenofibrate (TRICOR) 145 MG tablet Take 145 mg by mouth daily     Historical Provider, MD   pravastatin (PRAVACHOL) 80 MG tablet Take 80 mg by mouth daily    Historical Provider, MD   ranitidine (ZANTAC) 150 MG tablet Take 150 mg by mouth nightly as needed   Patient not taking: Reported on 2/21/2023    Historical Provider, MD   Cetirizine HCl (ZYRTEC ALLERGY PO) Take 10 mg by mouth nightly     Historical Provider, MD   Simethicone (MAALOX ANTI-GAS PO) Take by mouth as needed  Patient not taking: Reported on 2/21/2023    Historical Provider, MD       Current medications:    Current Facility-Administered Medications   Medication Dose Route Frequency Provider Last Rate Last Admin    [START ON 2/22/2023] amLODIPine (NORVASC) tablet 5 mg  5 mg Oral Daily Raven Serna MD        cetirizine (ZYRTEC) tablet 10 mg  10 mg Oral Nightly Raven Serna MD        Vitamin D (CHOLECALCIFEROL) tablet 2,000 Units  2,000 Units Oral Daily Raven Serna MD        vitamin B-12 (CYANOCOBALAMIN) tablet 2,500 mcg  2,500 mcg Oral Daily Raven Serna MD        polyvinyl alcohol (LIQUIFILM TEARS) 1.4 % ophthalmic solution 2 drop  2 drop Both Eyes PRN Rise Linear, MD        desvenlafaxine succinate (PRISTIQ) extended release tablet 100 mg  100 mg Oral Daily Rise Linear, MD        magnesium oxide (MAG-OX) tablet 200 mg  200 mg Oral 6x Daily Rise Linear, MD        oxybutynin (DITROPAN-XL) extended release tablet 10 mg  10 mg Oral Daily Rise Linear, MD Elliott Anna Lee ON 2/22/2023] pantoprazole (PROTONIX) tablet 40 mg  40 mg Oral QAM AC Rise Linear, MD        pravastatin (PRAVACHOL) tablet 80 mg  80 mg Oral Daily Rise Linear, MD        hydroCHLOROthiazide (HYDRODIURIL) tablet 25 mg  25 mg Oral Daily Rise Linear, MD        And    losartan (COZAAR) tablet 100 mg  100 mg Oral Daily Rise Linear, MD        sodium chloride flush 0.9 % injection 5-40 mL  5-40 mL IntraVENous 2 times per day Rise Linear, MD   10 mL at 02/21/23 1152    sodium chloride flush 0.9 % injection 5-40 mL  5-40 mL IntraVENous PRN Rise Linear, MD        0.9 % sodium chloride infusion   IntraVENous PRN Rise Linear, MD        enoxaparin (LOVENOX) injection 40 mg  40 mg SubCUTAneous Daily Rise Linear, MD        ondansetron (ZOFRAN-ODT) disintegrating tablet 4 mg  4 mg Oral Q8H PRN Rise Linear, MD        Or    ondansetron (ZOFRAN) injection 4 mg  4 mg IntraVENous Q6H PRN Rise Linear, MD        acetaminophen (TYLENOL) tablet 650 mg  650 mg Oral Q6H PRN Rise Linear, MD        Or    acetaminophen (TYLENOL) suppository 650 mg  650 mg Rectal Q6H PRN Rise Linear, MD        polyethylene glycol (GLYCOLAX) packet 17 g  17 g Oral Daily PRN Rise Linear, MD        senna (SENOKOT) 8.8 MG/5ML syrup 8.8 mg  5 mL Oral BID PRN Rise Linear, MD        cefTRIAXone (ROCEPHIN) 1,000 mg in sodium chloride 0.9 % 50 mL IVPB (mini-bag)  1,000 mg IntraVENous Q24H Rise Linear, MD        ketorolac (TORADOL) injection 15 mg  15 mg IntraVENous Q6H PRN Rise Linear, MD Allergies:     Allergies   Allergen Reactions    Compazine [Prochlorperazine Maleate] Other (See Comments)     \"I had the EPS symptoms\"    Penicillins Other (See Comments)     Uknown - the reaction was as a child       Problem List:    Patient Active Problem List   Diagnosis Code    Epigastric pain R10.13    Change in bowel habits R19.4    Essential hypertension I10    Tired R53.83    Kidney stone C29.0    Complicated UTI (urinary tract infection) N39.0       Past Medical History:        Diagnosis Date    Anxiety     Chronic kidney disease     Depression     Diverticulosis     EBV infection     Esophageal dilatation 2016    Fractured rib 2016    GERD (gastroesophageal reflux disease)     Heart murmur     Hiatal hernia     Hyperlipidemia     Hypertension        Past Surgical History:        Procedure Laterality Date    BLADDER SURGERY  2006    CHOLECYSTECTOMY      COLONOSCOPY  2012 2012 and 2019    GASTRIC FUNDOPLICATION  11/15/6297    GASTROPLASTY  06/28/2017    HYSTERECTOMY  2005    TONSILLECTOMY AND ADENOIDECTOMY  1994    UPPER GASTROINTESTINAL ENDOSCOPY  2016    with dilation       Social History:    Social History     Tobacco Use    Smoking status: Never    Smokeless tobacco: Never   Substance Use Topics    Alcohol use: Yes     Comment: occasional                                Counseling given: Not Answered      Vital Signs (Current):   Vitals:    02/21/23 0928 02/21/23 1045   BP: 135/81    Pulse: 74    Resp: 16    Temp: 99.3 °F (37.4 °C)    TempSrc: Oral    SpO2: 100%    Weight:  168 lb (76.2 kg)   Height:  5' 6\" (1.676 m)                                              BP Readings from Last 3 Encounters:   02/21/23 135/81   07/30/19 122/72   05/22/19 124/78       NPO Status:                                                                                 BMI:   Wt Readings from Last 3 Encounters:   02/21/23 168 lb (76.2 kg)   07/30/19 179 lb 6.4 oz (81.4 kg)   05/22/19 184 lb 3.2 oz (83.6 kg)     Body mass index is 27.12 kg/m². CBC:   Lab Results   Component Value Date/Time    WBC 8.7 02/21/2023 11:15 AM    RBC 3.55 02/21/2023 11:15 AM    RBC 3.88 11/27/2019 10:30 AM    HGB 10.6 02/21/2023 11:15 AM    HCT 33.9 02/21/2023 11:15 AM    MCV 95.5 02/21/2023 11:15 AM    RDW 12.8 11/27/2019 10:30 AM     02/21/2023 11:15 AM       CMP:   Lab Results   Component Value Date/Time     02/21/2023 11:15 AM    K 3.8 02/21/2023 11:15 AM     02/21/2023 11:15 AM    CO2 25 02/21/2023 11:15 AM    BUN 16 02/21/2023 11:15 AM    CREATININE 0.9 02/21/2023 11:15 AM    LABGLOM >60 02/21/2023 11:15 AM    GLUCOSE 102 02/21/2023 11:15 AM    PROT 6.3 02/21/2023 11:15 AM    CALCIUM 9.2 02/21/2023 11:15 AM    BILITOT 0.4 02/21/2023 11:15 AM    ALKPHOS 44 02/21/2023 11:15 AM    AST 45 02/21/2023 11:15 AM    ALT 24 02/21/2023 11:15 AM       POC Tests: No results for input(s): POCGLU, POCNA, POCK, POCCL, POCBUN, POCHEMO, POCHCT in the last 72 hours.     Coags: No results found for: PROTIME, INR, APTT    HCG (If Applicable): No results found for: PREGTESTUR, PREGSERUM, HCG, HCGQUANT     ABGs: No results found for: PHART, PO2ART, BNN7YQY, AAL4EAZ, BEART, C6TIYSOC     Type & Screen (If Applicable):  No results found for: LABABO, LABRH    Drug/Infectious Status (If Applicable):  No results found for: HIV, HEPCAB    COVID-19 Screening (If Applicable): No results found for: COVID19        Anesthesia Evaluation   no history of anesthetic complications:   Airway: Mallampati: II          Dental:          Pulmonary:normal exam        (-) COPD and asthma                           Cardiovascular:  Exercise tolerance: good (>4 METS),   (+) hypertension:,     (-) past MI and CAD                Neuro/Psych:   (+) psychiatric history:depression/anxiety    (-) seizures and CVA           GI/Hepatic/Renal:   (+) GERD:,      (-) liver disease and no renal disease       Endo/Other:        (-) diabetes mellitus, hypothyroidism, hyperthyroidism               Abdominal:             Vascular:     - DVT. Other Findings:           Anesthesia Plan      MAC     ASA 2       Induction: intravenous. Anesthetic plan and risks discussed with patient and spouse. Plan discussed with CRNA.                     Tristen Mccloud DO   2/21/2023

## 2023-02-21 NOTE — H&P
Hospitalist - History & Physical      Patient: Dayron Srivastava    Unit/Bed:8A-02/002-A  YOB: 1955  MRN: [id number]   Acct: [de-identified]   PCP: UMER Angelo - CNP    Date of Service: Pt seen/examined on 02/21/23  and Admitted to Inpatient with expected LOS greater than two midnights due to medical therapy. Chief Complaint:  Right flank pain    Assessment and Plan:-  Complicated UTI -in the setting of nephrolithiasis. Initiated Rocephin. Already received fluids at [facility]. Direct admitted. 5 mm right nephrolithiasis at [facility] -urology consulted. Sending uric acid. Chronic medical conditions reviewed, appropriate medications restarted. History Of Present Illness:    Dayron Srivastava is a 72-year-old female without significant past medical history except as below, who was direct admitted to [facility] after being evaluated at [address] [place] [address] for flank pain, she was found to have a UTI with a 5 mm right kidney stone at the [facility]. They do not have urology services there, therefore she was admitted to [facility]. Patient denies fever chills, shortness of breath, chest pain        Past Medical History:        Diagnosis Date    Anxiety     Chronic kidney disease     Depression     Diverticulosis     EBV infection     Esophageal dilatation 2016    Fractured rib 2016    GERD (gastroesophageal reflux disease)     Heart murmur     Hiatal hernia     Hyperlipidemia     Hypertension        Past Surgical History:        Procedure Laterality Date    BLADDER SURGERY  2006    CHOLECYSTECTOMY      COLONOSCOPY  2012 2012 and 2019    GASTRIC FUNDOPLICATION  14/24/4461    GASTROPLASTY  06/28/2017    HYSTERECTOMY  2005    TONSILLECTOMY AND ADENOIDECTOMY  1994    UPPER GASTROINTESTINAL ENDOSCOPY  2016    with dilation       Home Medications:   No current facility-administered medications on file prior to encounter.      Current Outpatient Medications on File Prior to Encounter   Medication Sig Dispense Refill desvenlafaxine succinate (PRISTIQ) 100 MG TB24 extended release tablet Take 1 tablet by mouth daily 30 tablet 1    cycloSPORINE (RESTASIS) 0.05 % ophthalmic emulsion Apply 1 drop to eye 2 times daily      Lutein-Zeaxanthin 25-5 MG CAPS Take 1 tablet by mouth daily (Patient not taking: Reported on 2/21/2023)      traZODone (DESYREL) 50 MG tablet Take 0.5-1 tablets by mouth nightly (Patient not taking: Reported on 2/21/2023) 30 tablet 1    amLODIPine (NORVASC) 5 MG tablet Take 5 mg by mouth daily      Omega-3 Fatty Acids (FISH OIL) 1000 MG CAPS Take 1,000 mg by mouth 4 times daily      Probiotic Product (PROBIOTIC ADVANCED PO) Take 1 capsule by mouth 2 times daily (Patient not taking: Reported on 2/21/2023)      promethazine (PHENERGAN) 25 MG tablet as needed (Patient not taking: Reported on 2/21/2023)      aspirin 81 MG tablet Take 81 mg by mouth daily as needed  (Patient not taking: Reported on 2/21/2023)      cyanocobalamin 1000 MCG tablet Take 2,500 mcg by mouth daily      magnesium (MAGNESIUM-OXIDE) 250 MG TABS tablet Take 250 mg by mouth 6 times daily If loose, switch some or all to Slow Mag mag chloride      oxybutynin (DITROPAN-XL) 10 MG extended release tablet Take 10 mg by mouth daily      fluticasone (FLONASE) 50 MCG/ACT nasal spray 2 sprays by Each Nare route daily (Patient not taking: Reported on 2/21/2023)      ondansetron (ZOFRAN) 4 MG tablet Take 4 mg by mouth every 8 hours as needed for Nausea or Vomiting (Patient not taking: Reported on 2/21/2023)      Cholecalciferol (VITAMIN D3) 2000 UNITS CAPS Take 2,000 Units by mouth daily      olmesartan-hydrochlorothiazide (BENICAR HCT) 40-25 MG per tablet Take 1 tablet by mouth daily      pantoprazole (PROTONIX) 40 MG tablet Take 40 mg by mouth every morning (before breakfast)       celecoxib (CELEBREX) 200 MG capsule Take 200 mg by mouth daily      fenofibrate (TRICOR) 145 MG tablet Take 145 mg by mouth daily       pravastatin (PRAVACHOL) 80 MG tablet Take 80 mg by mouth daily      ranitidine (ZANTAC) 150 MG tablet Take 150 mg by mouth nightly as needed  (Patient not taking: Reported on 2/21/2023)      Cetirizine HCl (ZYRTEC ALLERGY PO) Take 10 mg by mouth nightly       Simethicone (MAALOX ANTI-GAS PO) Take by mouth as needed (Patient not taking: Reported on 2/21/2023)         Allergies:    Compazine [prochlorperazine maleate] and Penicillins    Social History:    reports that she has never smoked. She has never used smokeless tobacco. She reports current alcohol use. She reports that she does not use drugs. Family History:       Problem Relation Age of Onset    Heart Disease Mother 55    Depression Mother     Other Father 80        myeloidosis    Colon Polyps Father [de-identified]    Kidney Disease Father     Alcohol Abuse Father     Thyroid Cancer Sister 24    Ovarian Cancer Sister 48    Kidney Cancer Paternal Grandmother [de-identified]    Other Sister         chronic fatigue; eating disorder    Hypertension Sister     Colon Cancer Neg Hx        Diet:  Diet NPO    Review of systems:   Pertinent positives as noted in the HPI. All other systems reviewed and negative. PHYSICAL EXAM:  /81   Pulse 74   Temp 99.3 °F (37.4 °C) (Oral)   Resp 16   SpO2 100%   Physical Exam  Vitals and nursing note reviewed. Constitutional:       Appearance: Normal appearance. HENT:      Head: Normocephalic and atraumatic. Right Ear: External ear normal.      Left Ear: External ear normal.      Nose: Nose normal.   Eyes:      Extraocular Movements: Extraocular movements intact. Cardiovascular:      Rate and Rhythm: Normal rate and regular rhythm. Pulses: Normal pulses. Heart sounds: Normal heart sounds. Pulmonary:      Effort: Pulmonary effort is normal.      Breath sounds: Normal breath sounds. Abdominal:      General: Abdomen is flat. Palpations: Abdomen is soft. Tenderness: There is right CVA tenderness. Musculoskeletal:         General: No deformity.  Normal range of motion. Cervical back: Normal range of motion and neck supple. Skin:     General: Skin is warm and dry. Capillary Refill: Capillary refill takes less than 2 seconds. Neurological:      General: No focal deficit present. Mental Status: She is alert. Mental status is at baseline. Psychiatric:         Mood and Affect: Mood normal.         Behavior: Behavior normal.         Labs:   No results for input(s): WBC, HGB, HCT, PLT in the last 72 hours. No results for input(s): NA, K, CL, CO2, BUN, CREATININE, CALCIUM, PHOS in the last 72 hours. Invalid input(s): MAGNES  No results for input(s): AST, ALT, BILIDIR, BILITOT, ALKPHOS in the last 72 hours. No results for input(s): INR in the last 72 hours. No results for input(s): Tejal Coombes in the last 72 hours. Urinalysis:    No results found for: Alvena Cassis, BACTERIA, RBCUA, BLOODU, [address], [address] [place] [address]    Radiology:   US RENAL COMPLETE    (Results Pending)     No results found.       EKG: No prior ECG    Electronically signed by Jose Cesar MD on 2/21/2023 at 10:38 AM

## 2023-02-21 NOTE — CONSULTS
[address] OR  [address] [facility] [address]  Dept: [phone]  Loc: [phone]  Visit Date: 2/21/2023    Urology Consult Note    Reason for Consult:  ureteral stone  Requesting Physician:  medicine    History Obtained From:  patient, electronic medical record    Chief Complaint: flank pain    HISTORY OF PRESENT ILLNESS:                The patient is a 79 y.o. female with significant past medical history of see below who presents with flank pain. Also reports mild fever, mild chills. Hx of septic stone, 2021  Feels well on arrival from [facility]    Past Medical History:        Diagnosis Date    Anxiety     Chronic kidney disease     Depression     Diverticulosis     EBV infection     Esophageal dilatation 2016    Fractured rib 2016    GERD (gastroesophageal reflux disease)     Heart murmur     Hiatal hernia     Hyperlipidemia     Hypertension      Past Surgical History:        Procedure Laterality Date    BLADDER SURGERY  2006    CHOLECYSTECTOMY      COLONOSCOPY  2012 2012 and 2019    GASTRIC FUNDOPLICATION  21/82/4366    GASTROPLASTY  06/28/2017    HYSTERECTOMY  2005    TONSILLECTOMY AND ADENOIDECTOMY  1994    UPPER GASTROINTESTINAL ENDOSCOPY  2016    with dilation     Allergies:  Compazine [prochlorperazine maleate] and Penicillins  Social History:  Social History     Socioeconomic History    Marital status:      Spouse name: Leigha Cortes    Number of children: Not on file    Years of education: Not on file    Highest education level:  Bachelor's degree (e.g., BA, AB, BS)   Occupational History    Not on file   Tobacco Use    Smoking status: Never    Smokeless tobacco: Never   Vaping Use    Vaping Use: Never used   Substance and Sexual Activity    Alcohol use: Yes     Comment: occasional    Drug use: No    Sexual activity: Yes     Partners: Male   Other Topics Concern    Not on file   Social History Narrative    5/16/2019    LEVEL OF EDUCATION: graduated high school; 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 Worked at [employer] from [id number] as . 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 Worked at [employer] from  - 2017. States she worked at [employer] [id number]. She states \"I think they let me go because I was over on my FMLA. \" They were also downsizing per her report. States she worked at [employer] from 2007 through Dec. 2010. MARRIAGES: once. She and her   Oct. 16, 1976    CHILDREN: 2 adult daughters    SUBSTANCE USE: None     Social Determinants of Health     Financial Resource Strain: Not on file   Food Insecurity: Not on file   Transportation Needs: Not on file   Physical Activity: Not on file   Stress: Not on file   Social Connections: Not on file   Intimate Partner Violence: Not on file   Housing Stability: Not on file     Family History:       Problem Relation Age of Onset    Heart Disease Mother 55    Depression Mother     Other Father 80        myeloidosis    Colon Polyps Father [de-identified]    Kidney Disease Father     Alcohol Abuse Father     Thyroid Cancer Sister 24    Ovarian Cancer Sister 48    Kidney Cancer Paternal Grandmother [de-identified]    Other Sister         chronic fatigue; eating disorder    Hypertension Sister     Colon Cancer Neg Hx        ROS:  Constitutional: Negative for chills, fatigue, fever, or weight loss. Eyes: Denies reported visual changes.   ENT: Denies headache, difficulty swallowing, earache, and nosebleeds. Cardiovascular: Negative for chest pain, palpitations, tachycardia or edema. Respiratory: Denies cough or SOB. GI:The patient denies abdominal or flank pain, anorexia, nausea or vomiting. : see HPI. Musculoskeletal: Patient denies low back pain or painful or reduced range of ROM. Neurological: The patient denies any symptoms of neurological impairment or TIA. Psychiatric: Denies anxiety or depression. Skin: Denies rash or lesions. All remaining ROS negative. PHYSICAL EXAM:  VITALS:  /81   Pulse 74   Temp 99.3 °F (37.4 °C) (Oral)   Resp 16   Ht 5' 6\" (1.676 m)   Wt 168 lb (76.2 kg)   SpO2 100%   BMI 27.12 kg/m² . Nursing note and vitals reviewed. Constitutional: Alert and oriented times x3, no acute distress, and cooperative to examination with appropriate mood and affect. HEENT:   Head:         Normocephalic and atraumatic. Mouth/Throat:          Mucous membranes are normal.   Eyes:         EOM are normal. No scleral icterus. Nose:    The external appearance of the nose is normal  Ears: The ears appear normal to external inspection. Cardiovascular:       Normal rate, regular rhythm. Pulmonary/Chest:  Normal respiratory rate and rhthym. No use of accessory muscles. Lungs clear bilaterally. Abdominal:          Soft. No tenderness. Active bowel sounds             Genitalia:    Voiding spontaneously  Musculoskeletal:    Normal range of motion. He exhibits no edema or tenderness of lower extremities. Extremities:    No cyanosis, clubbing, or edema present. Neurological:    Alert and oriented.      DATA:  CBC:   Lab Results   Component Value Date/Time    WBC 8.7 02/21/2023 11:15 AM    RBC 3.55 02/21/2023 11:15 AM    RBC 3.88 11/27/2019 10:30 AM    HGB 10.6 02/21/2023 11:15 AM    HCT 33.9 02/21/2023 11:15 AM    MCV 95.5 02/21/2023 11:15 AM    MCH 29.9 02/21/2023 11:15 AM    MCHC 31.3 02/21/2023 11:15 AM    RDW 12.8 11/27/2019 10:30 AM     02/21/2023 11:15 AM MPV 10.1 02/21/2023 11:15 AM     BMP:    Lab Results   Component Value Date/Time     02/21/2023 11:15 AM    K 3.8 02/21/2023 11:15 AM     02/21/2023 11:15 AM    CO2 25 02/21/2023 11:15 AM    BUN 16 02/21/2023 11:15 AM    CREATININE 0.9 02/21/2023 11:15 AM    CALCIUM 9.2 02/21/2023 11:15 AM    LABGLOM >60 02/21/2023 11:15 AM    GLUCOSE 102 02/21/2023 11:15 AM     BUN/Creatinine:    Lab Results   Component Value Date/Time    BUN 16 02/21/2023 11:15 AM    CREATININE 0.9 02/21/2023 11:15 AM     Magnesium:    Lab Results   Component Value Date/Time    MG 2.1 11/27/2019 10:30 AM     Phosphorus:  No results found for: PHOS  PT/INR:  No results found for: PROTIME, INR  U/A:  No results found for: NITRITE, COLORU, PHUR, LABCAST, WBCUA, RBCUA, MUCUS, TRICHOMONAS, YEAST, BACTERIA, CLARITYU, SPECGRAV, LEUKOCYTESUR, [address], BILIRUBINUR, BLOODU, GLUCOSEU, AMORPHOUS    Imaging: The patient has had a CT Without and With Contrast from OSH which I have independently reviewed along with its accompanying report. The study demonstrates right obstructive 5mm stone at UVJ    IMPRESSION/Plan:    NPO  Consent obtained  Abx   Flomax    Ureteral stone - right obstructive 5mm stone at UVJ  UTI - Ua +nitrite, leuk, on rocephin    Cystoscopy, right ureteral stent placement per Dr Tasia Thomason. I described the procedure in detail and also described the associated risks and benefits at length. We discussed possible alternative therapies. We discussed the risks and benefits of not undergoing therapy. Patient understands these risks and benefits and desires to proceed. Post-op expectations were discussed; stent pain, urinary frequency and urgency secondary to the stent, dysuria which should improve 1-2 days after procedure, and intermittent hematuria can be expected as long as stent is in place.      Will need definite stone tx as outpatient  Ok to eat after surgery    Thank you for including us in the care of Grayson Cardoso Sandy Le, UMER - CNP, APRN  02/21/23 12:11 PM  Urology

## 2023-02-21 NOTE — BRIEF OP NOTE
Brief Postoperative Note      Patient: Meena Arriaga  YOB: 1955  MRN: [id number]    Date of Procedure: 2/21/2023    Pre-Op Diagnosis: Ureteral stone [N20.1]    Post-Op Diagnosis: Same       Procedure(s):  CYSTOSCOPY RIGHT URETERAL STENT INSERTION    Surgeon(s):  Velasquez Esquivel MD    Assistant:  * No surgical staff found *    Anesthesia: Monitor Anesthesia Care    Estimated Blood Loss (mL): Minimal    Complications: None    Specimens:   * No specimens in log *    Implants:  Implant Name Type Inv.  Item Serial No.  Lot No. LRB No. Used Action   Carito Christy 6FR L26CM HYDR+ PGTL Elier Alamin MRK  - [id number]  Carito Christy 6FR L26CM HYDR+ PGTL TAPR TIP GRAD BLDR MRK LO  [employer] [facility] UROLOGY- [id number] Right 1 Implanted         Drains: * No LDAs found *    Findings: stone treatment in few weeks needed    Electronically signed by Dipika Nguyen MD on 2/21/2023 at 1:29 PM

## 2023-02-22 ENCOUNTER — TELEPHONE (OUTPATIENT)
Dept: UROLOGY | Age: 68
End: 2023-02-22

## 2023-02-22 VITALS
SYSTOLIC BLOOD PRESSURE: 135 MMHG | BODY MASS INDEX: 27.13 KG/M2 | OXYGEN SATURATION: 100 % | DIASTOLIC BLOOD PRESSURE: 72 MMHG | TEMPERATURE: 98.8 F | HEART RATE: 68 BPM | WEIGHT: 168.8 LBS | HEIGHT: 66 IN | RESPIRATION RATE: 16 BRPM

## 2023-02-22 LAB
BASOPHILS ABSOLUTE: 0 THOU/MM3 (ref 0–0.1)
BASOPHILS NFR BLD AUTO: 0.4 %
DEPRECATED RDW RBC AUTO: 42.2 FL (ref 35–45)
EOSINOPHIL NFR BLD AUTO: 0.6 %
EOSINOPHILS ABSOLUTE: 0 THOU/MM3 (ref 0–0.4)
ERYTHROCYTE [DISTWIDTH] IN BLOOD BY AUTOMATED COUNT: 12.6 % (ref 11.5–14.5)
HCT VFR BLD AUTO: 32.8 % (ref 37–47)
HGB BLD-MCNC: 10.8 GM/DL (ref 12–16)
IMM GRANULOCYTES # BLD AUTO: 0.02 THOU/MM3 (ref 0–0.07)
IMM GRANULOCYTES NFR BLD AUTO: 0.4 %
LYMPHOCYTES ABSOLUTE: 0.7 THOU/MM3 (ref 1–4.8)
LYMPHOCYTES NFR BLD AUTO: 13.1 %
MCH RBC QN AUTO: 30.3 PG (ref 26–33)
MCHC RBC AUTO-ENTMCNC: 32.9 GM/DL (ref 32.2–35.5)
MCV RBC AUTO: 92.1 FL (ref 81–99)
MONOCYTES ABSOLUTE: 0.5 THOU/MM3 (ref 0.4–1.3)
MONOCYTES NFR BLD AUTO: 10.1 %
NEUTROPHILS NFR BLD AUTO: 75.4 %
NRBC BLD AUTO-RTO: 0 /100 WBC
PLATELET # BLD AUTO: 239 THOU/MM3 (ref 130–400)
PMV BLD AUTO: 10.6 FL (ref 9.4–12.4)
RBC # BLD AUTO: 3.56 MILL/MM3 (ref 4.2–5.4)
SEGMENTED NEUTROPHILS ABSOLUTE COUNT: 4 THOU/MM3 (ref 1.8–7.7)
WBC # BLD AUTO: 5.3 THOU/MM3 (ref 4.8–10.8)

## 2023-02-22 PROCEDURE — 36415 COLL VENOUS BLD VENIPUNCTURE: CPT

## 2023-02-22 PROCEDURE — 2580000003 HC RX 258: Performed by: STUDENT IN AN ORGANIZED HEALTH CARE EDUCATION/TRAINING PROGRAM

## 2023-02-22 PROCEDURE — 6370000000 HC RX 637 (ALT 250 FOR IP): Performed by: STUDENT IN AN ORGANIZED HEALTH CARE EDUCATION/TRAINING PROGRAM

## 2023-02-22 PROCEDURE — 6370000000 HC RX 637 (ALT 250 FOR IP): Performed by: UROLOGY

## 2023-02-22 PROCEDURE — 99239 HOSP IP/OBS DSCHRG MGMT >30: CPT | Performed by: PHYSICIAN ASSISTANT

## 2023-02-22 PROCEDURE — 6360000002 HC RX W HCPCS: Performed by: STUDENT IN AN ORGANIZED HEALTH CARE EDUCATION/TRAINING PROGRAM

## 2023-02-22 PROCEDURE — 85025 COMPLETE CBC W/AUTO DIFF WBC: CPT

## 2023-02-22 PROCEDURE — 99232 SBSQ HOSP IP/OBS MODERATE 35: CPT | Performed by: UROLOGY

## 2023-02-22 RX ORDER — PHENAZOPYRIDINE HYDROCHLORIDE 100 MG/1
100 TABLET, FILM COATED ORAL 3 TIMES DAILY PRN
Qty: 9 TABLET | Refills: 0 | Status: SHIPPED | OUTPATIENT
Start: 2023-02-22 | End: 2023-02-25

## 2023-02-22 RX ORDER — CEFDINIR 300 MG/1
300 CAPSULE ORAL 2 TIMES DAILY
Qty: 20 CAPSULE | Refills: 0 | Status: SHIPPED | OUTPATIENT
Start: 2023-02-22 | End: 2023-03-04

## 2023-02-22 RX ORDER — TAMSULOSIN HYDROCHLORIDE 0.4 MG/1
0.4 CAPSULE ORAL DAILY
Qty: 30 CAPSULE | Refills: 0 | Status: SHIPPED | OUTPATIENT
Start: 2023-02-23

## 2023-02-22 RX ADMIN — SODIUM CHLORIDE, PRESERVATIVE FREE 10 ML: 5 INJECTION INTRAVENOUS at 09:55

## 2023-02-22 RX ADMIN — TAMSULOSIN HYDROCHLORIDE 0.4 MG: 0.4 CAPSULE ORAL at 09:51

## 2023-02-22 RX ADMIN — CEFTRIAXONE SODIUM 1000 MG: 1 INJECTION, POWDER, FOR SOLUTION INTRAMUSCULAR; INTRAVENOUS at 12:52

## 2023-02-22 RX ADMIN — Medication 200 MG: at 06:02

## 2023-02-22 RX ADMIN — KETOROLAC TROMETHAMINE 15 MG: 30 INJECTION, SOLUTION INTRAMUSCULAR; INTRAVENOUS at 09:50

## 2023-02-22 RX ADMIN — OXYBUTYNIN CHLORIDE 10 MG: 10 TABLET, EXTENDED RELEASE ORAL at 09:51

## 2023-02-22 RX ADMIN — HYDROCHLOROTHIAZIDE 25 MG: 25 TABLET ORAL at 09:51

## 2023-02-22 RX ADMIN — Medication 2000 UNITS: at 09:53

## 2023-02-22 RX ADMIN — LOSARTAN POTASSIUM 100 MG: 100 TABLET, FILM COATED ORAL at 09:51

## 2023-02-22 RX ADMIN — PANTOPRAZOLE SODIUM 40 MG: 40 TABLET, DELAYED RELEASE ORAL at 06:02

## 2023-02-22 RX ADMIN — Medication 2500 MCG: at 09:51

## 2023-02-22 RX ADMIN — ACETAMINOPHEN 650 MG: 325 TABLET ORAL at 09:50

## 2023-02-22 ASSESSMENT — PAIN SCALES - GENERAL
PAINLEVEL_OUTOF10: 0
PAINLEVEL_OUTOF10: 0
PAINLEVEL_OUTOF10: 5

## 2023-02-22 ASSESSMENT — PAIN DESCRIPTION - DESCRIPTORS: DESCRIPTORS: ACHING

## 2023-02-22 ASSESSMENT — PAIN - FUNCTIONAL ASSESSMENT: PAIN_FUNCTIONAL_ASSESSMENT: ACTIVITIES ARE NOT PREVENTED

## 2023-02-22 ASSESSMENT — PAIN DESCRIPTION - LOCATION: LOCATION: FLANK

## 2023-02-22 ASSESSMENT — PAIN DESCRIPTION - ORIENTATION: ORIENTATION: RIGHT

## 2023-02-22 NOTE — PROGRESS NOTES
Dom Li, APRN - CNP  Urology Progress Note    Subjective: Pro Diehl is a 79 y.o. female. s/p CYSTOSCOPY RIGHT URETERAL STENT INSERTION on 2/21/2023    His/Her current Diet is: ADULT DIET; Regular. Since the previous note, the patient reports the following:  No acute issues overnight. No fevers or chills. No nausea or vomiting. No chest pain or shortness of breath. No calf pain. Pain Controlled. Ambulating. Tolerating PO Diet. Tolerating stent well  Plan for definite stone tx as outpatient  Send with abx    Vitals and Labs:  Patient Vitals for the past 24 hrs:   BP Temp Temp src Pulse Resp SpO2 Weight   02/22/23 1230 135/72 98.8 °F (37.1 °C) Oral 68 16 100 % --   02/22/23 0930 132/65 100 °F (37.8 °C) Oral 68 16 100 % --   02/22/23 0322 129/62 99.3 °F (37.4 °C) Oral 67 16 98 % 168 lb 12.8 oz (76.6 kg)   02/21/23 2354 123/64 98.2 °F (36.8 °C) Oral 71 17 99 % --   02/21/23 2007 (!) 127/53 99.6 °F (37.6 °C) Oral 64 16 99 % --   02/21/23 1519 (!) 153/69 99.1 °F (37.3 °C) Oral 68 16 100 % --     I/O last 3 completed shifts: In: 990 [P.O.:360; I.V.:600; IV Piggyback:30]  Out: 0     Recent Labs     02/21/23  1115 02/22/23  0554   WBC 8.7 5.3   HGB 10.6* 10.8*   HCT 33.9* 32.8*   MCV 95.5 92.1    239     Recent Labs     02/21/23  1115      K 3.8      CO2 25   BUN 16   CREATININE 0.9       Recent Labs     02/21/23  1530   COLORU YELLOW   PHUR 7.0   WBCUA 5-9   RBCUA > 200   YEAST NONE SEEN   BACTERIA NONE SEEN   LEUKOCYTESUR TRACE*   UROBILINOGEN 0.2   BILIRUBINUR NEGATIVE   BLOODU LARGE*       Physical Exam:  No acute distress. Awake, alert and oriented. Neck is supple  Regular rate and rhythm. Normal peripheral pulses  No accessory muscles of inspiration. Symmetric chest rise  Abdomen soft, non-tender, non-distended. No CVA tenderness. Voiding tea colored urine  No calf pain. Minimal/no edema in bilateral lower extremities.   Skin is warm, dry  Psych: mood, affect normal    Additional Lab/Culture results:     Imaging Reviewed:     UMER Vivas CNP independently reviewed the images and verified the radiology reports from:    US RENAL COMPLETE    Result Date: 2/21/2023  Ultrasound renal COMPARISON: No prior FINDINGS: The right kidney was measured at 11.4 cm in length. The left kidney was measured at 11.9 cm in length. Using the liver parenchyma as an internal reference, both kidneys are normal in echogenicity. There is no hydronephrosis and no perinephric collections are seen. The urinary bladder is incompletely fluid distended limiting its evaluation. Normal sized kidneys without hydronephrosis. Please note that this examination is not reliable for evaluation of renal stones. History of right stent placed today. No stent is demonstrated on this examination. Recommend noncontrast CT abdomen and pelvis if further imaging is warranted.  This document has been electronically signed by: Regina Montes MD on 02/21/2023 11:31 PM      Impression:    Patient Active Problem List   Diagnosis    Epigastric pain    Change in bowel habits    Essential hypertension    Tired    Kidney stone    Complicated UTI (urinary tract infection)       s/p CYSTOSCOPY RIGHT URETERAL STENT INSERTION on 2/21/2023    UMER Vivas CNP  2:45 PM 2/22/2023

## 2023-02-22 NOTE — CARE COORDINATION
Case Management Assessment  Initial Evaluation    Date/Time of Evaluation: 2/22/2023 11:55 AM  Assessment Completed by: Dayna Benz RN    If patient is discharged prior to next notation, then this note serves as note for discharge by case management. Patient Name: Leticia Beal                   YOB: 1955  Diagnosis: Kidney stone [U78.8]  Complicated UTI (urinary tract infection) [N39.0]                   Date / Time: 2/21/2023  9:27 AM  [facility]: [facility]02/002     Patient Admission Status: Inpatient   Readmission Risk Low 0-14, Mod 15-19), High > 20: Readmission Risk Score: 7.5    Current PCP: No primary care provider on file. PCP verified by CM? Yes    Chart Reviewed: Yes      History Provided by: Patient  Patient Orientation: Alert and Oriented    Patient Cognition: Alert    Hospitalization in the last 30 days (Readmission):  No    If yes, Readmission Assessment in CM Navigator will be completed. Advance Directives:      Code Status: Full Code   Patient's Primary Decision Maker is: Legal Next of Kin      Discharge Planning:    Patient lives with: Spouse/Significant Other Type of Home: House  Primary Care Giver: Self  Patient Support Systems include: Spouse/Significant Other   Current Financial resources: Medicare  Current community resources: None  Current services prior to admission: None            Current DME:              Type of Home Care services:  None    ADLS  Prior functional level: Independent in ADLs/IADLs  Current functional level: Independent in ADLs/IADLs    Family can provide assistance at DC: Yes  Would you like Case Management to discuss the discharge plan with any other family members/significant others, and if so, who?  No  Plans to Return to Present Housing: Yes  Other Identified Issues/Barriers to RETURNING to current housing: no  Potential Assistance needed at discharge: N/A            Potential DME:    Patient expects to discharge to: [address] [place] [address] transportation at discharge: Self    Financial    Payor: MEDICARE / Plan: MEDICARE PART A AND B / Product Type: *No Product type* /     Does insurance require precert for SNF: No    Potential assistance Purchasing Medications: No  Meds-to-Beds request: Yes      [facility] [id number] - [address] [facility]  [address] [address] [facility]  Phone: [phone] Fax: [phone]    [address] [facility]  [address]  [address]  Phone: [phone] Fax: [phone]      Notes:    Factors facilitating achievement of predicted outcomes: Family support, Motivated, Cooperative, and Pleasant    Barriers to discharge: Medical complications    Additional Case Management Notes: Direct admit as inpatient from [facility] with UTI and kidney stone. Procedure: 2/21 Cystoscopy, right ureteral stent placement    The Plan for Transition of Care is related to the following treatment goals of Kidney stone [U35.5]  Complicated UTI (urinary tract infection) [N39.0]    Patient Goals/Plan/Treatment Preferences: From home with . Denies any discharge needs. Transportation/Food Security/Housekeeping Addressed: No issues identified. Quincy Person RN  Case Management Department      2/22/23, 11:56 AM EST    Patient goals/plan/ treatment preferences discussed by  and . Patient goals/plan/ treatment preferences reviewed with patient/ family. Patient/ family verbalize understanding of discharge plan and are in agreement with goal/plan/treatment preferences. Understanding was demonstrated using the teach back method. AVS provided by RN at time of discharge, which includes all necessary medical information pertaining to the patients current course of illness, treatment, post-discharge goals of care, and treatment preferences.      Services At/After Discharge: None       IMM Letter  IMM Letter given to Patient/Family/Significant other/Guardian/POA/by[de-identified] patient access  IMM Letter date given[de-identified] 02/21/23  IMM Letter time given[de-identified] 8957

## 2023-02-23 ENCOUNTER — TELEPHONE (OUTPATIENT)
Dept: UROLOGY | Age: 68
End: 2023-02-23

## 2023-02-23 NOTE — TELEPHONE ENCOUNTER
Patient scheduled with Dr. Milton Smith on 3/7/23. Surgery consent to be done upon arrival.  Surgery instructions mailed to patient. Patient will have an adult over the age of 25 with them at discharge and 24 hours after procedure.

## 2023-02-23 NOTE — TELEPHONE ENCOUNTER
SURGERY 8[address]  [address] [place] [address] [address] [place], Radhika Fern[address]      Phone *[phone] *[phone]   Surgical Scheduling Direct Line Phone *[phone] Fax *[phone]      Leticia Bertrandphilipp 1955 female    [address]   Marital Status:          Home Phone: [phone]      Cell Phone:    Telephone Information:   Mobile [phone]          Surgeon: Dr. Danilo Motta Surgery Date: 3/7/2023   Time: 9:00am    Procedure: Cystoscopy, right ureteroscopy, laser lithotripsy, basket retrieval of stone fragments, and possible right ureteral stent exchange    Diagnosis: kidney stone     Important Medical History:  In Epic    Special Inst/Equip:     CPT Codes:    96490  Latex Allergy: No     Cardiac Device:  No    Anesthesia:  General          Admission Type:  Same Day                        Admit Prior to Day of Surgery: No    Case Location:  Main OR            Preadmission Testing:  Phone Call          PAT Date and Time:______________________________________________________    PAT Confirmation #: ______________________________________________________    Post Op Visit: ___________________________________________________________    Need Preop Cardiac Clearance: No    Does Patient have Cardiologist/physician?     none    Surgery Confirmation #: __________________________________________________    : ________________________   Date: __________________________     [employer] Name: [facility] [place]

## 2023-02-23 NOTE — TELEPHONE ENCOUNTER
DO NOT TAKE FISH OIL, IBUPROFEN, MOTRIN-LIKE DRUGS AND ANY MULTIVITAMINS OR OVER THE COUNTER SUPPLEMENTS 14 DAYS PRIOR TO SURGERY. MUST HAVE AN ADULT OVER THE AGE OF 18 WITH YOU AT THE TIME OF THE PROCEDURE AND WITH YOU AT HOME AFTER THE PROCEDURE FOR 24 HOURS       Amena Razo 1955 Diagnosis:     Surgical Physician: Dr. Lucrecia Hamman have been scheduled for the procedure marked below:      Surgery: Cystoscopy, right ureteroscopy, laser lithotripsy, basket retrieval of stone fragments, and possible right ureteral stent exchange        Date: 3/7/2023     Anesthesia: Anesthesiologist (General/Spinal)     Place of Service: [address] [place] [address] Second Floor Same Day Surgery         Arrive to same day surgery by:  7:00am  (Surgery time is subject to change)      INSTRUCTIONS AS MARKED BELOW:    1.  DO NOT eat or drink anything after midnight before surgery. 2.  We prefer you shower or bathe with an antibacterial soap (Dial) the morning of surgery. 3  Please bring a current medication list, photo ID and insurance card(s) with you  4. Okay to take Tylenol  5. If you take Glucophage, Metformin or Janumet, hold 48-hours prior to surgery  6. Take blood pressure or heart medication as directed, if taken in the morning take with a small sip of water  7. The office will call you in 1-2 days after your procedure to schedule a follow up.               Date: 2/23/2023

## 2023-02-24 ENCOUNTER — TELEPHONE (OUTPATIENT)
Dept: UROLOGY | Age: 68
End: 2023-02-24

## 2023-02-24 ENCOUNTER — PREP FOR PROCEDURE (OUTPATIENT)
Dept: UROLOGY | Age: 68
End: 2023-02-24

## 2023-02-25 RX ORDER — SODIUM CHLORIDE 0.9 % (FLUSH) 0.9 %
5-40 SYRINGE (ML) INJECTION PRN
Status: CANCELLED | OUTPATIENT
Start: 2023-02-25

## 2023-02-25 RX ORDER — SODIUM CHLORIDE 9 MG/ML
INJECTION, SOLUTION INTRAVENOUS PRN
Status: CANCELLED | OUTPATIENT
Start: 2023-02-25

## 2023-02-25 RX ORDER — SODIUM CHLORIDE 0.9 % (FLUSH) 0.9 %
5-40 SYRINGE (ML) INJECTION EVERY 12 HOURS SCHEDULED
Status: CANCELLED | OUTPATIENT
Start: 2023-02-25

## 2023-02-25 NOTE — DISCHARGE SUMMARY
Hospitalist Discharge Summary    Patient: Meena Arriaga  YOB: 1955  MRN: [id number]   Acct: [de-identified]    Primary Care Physician: UMER Cancino CNP    Admit date  2/21/2023    Discharge date: 2/22/2023     Discharge Assessment and Plan:    5 mm right nephrolithiasis at [facility]: s/p cystoscopy with right ureteral stent insertion on 2/21/2023. Continue cefdinir at discharge. Urology will follow up as outpatient for definitive stone management. Case was discussed with Urology. UTI: Received IV ceftriaxone x2. Continue p.o. cefdinir at discharge for 10 days. [address] [place] [address] was contacted, urine culture has not resulted yet. Urology will follow-up on final urine culture and definitive stone management as above. Essential hypertension: Continue home meds        Chief Complaint on presentation: Right flank pain    Initial H&P / Hospital Course:   Per HPI: Gerardine Babinski is a 57-year-old female without significant past medical history except as below, who was direct admitted to [facility] after being evaluated at [address] [place] [address] for flank pain, she was found to have a UTI with a 5 mm right kidney stone at the [facility]. They do not have urology services there, therefore she was admitted to [facility]. Patient denies fever chills, shortness of breath, chest pain\"     Subjective (day of discharge): Patient reports continued right flank pain, but tolerable. Mild dysuira. No fever/chills, SOB, CP, NVD. Ambulating, tolerating general diet. Patient responded well to medical management. Consultants were contacted and agree with discharge plan. The patient was discharged in stable condition with appropriate outpatient follow up arranged. Physical Exam:-  Vitals: No data found. Weight: Weight: 168 lb 12.8 oz (76.6 kg)   24 hour intake/output: No intake or output data in the 24 hours ending 02/25/23 1554    General appearance: No apparent distress, appears stated age and cooperative.    HEENT: Normal cephalic, atraumatic without obvious deformity. Pupils equal, round, and reactive to light. Extra ocular muscles intact. Conjunctivae/corneas clear. Neck: Supple, with full range of motion. No jugular venous distention. Trachea midline. Respiratory:  Normal respiratory effort. Clear to auscultation, bilaterally without Rales/Wheezes/Rhonchi. Cardiovascular: Regular rate and rhythm with normal S1/S2 without murmurs, rubs or gallops. Abdomen: Soft, non-tender, non-distended with normal bowel sounds. Musculoskeletal:  No clubbing, cyanosis or edema bilaterally. Skin: Skin color, texture, turgor normal.  No rashes or lesions. Neurologic:  Neurovascularly intact without any focal sensory/motor deficits. Cranial nerves: II-XII intact, grossly non-focal.  Psychiatric: Alert and oriented, thought content appropriate, normal insight  Capillary Refill: Brisk,< 3 seconds   Peripheral Pulses: +2 palpable, equal bilaterally     Labs :  No results found for this or any previous visit (from the past 72 hour(s)). Microbiology:    Blood culture #1:   Lab Results   Component Value Date/Time    BC No growth 24 hours. No growth 48 hours.  02/21/2023 11:17 AM     Blood culture #2:No results found for: Kathryn Nettle  Organism:  No results found for: LABGRAM  MRSA culture only:No results found for: [facility]  Urine culture: No results found for: LABURIN  No results found for: ORG   Respiratory culture: No results found for: CULTRESP  Aerobic and Anaerobic :  No results found for: LABAERO  No results found for: LABANAE    Urinalysis:     Lab Results   Component Value Date/Time    NITRU NEGATIVE 02/21/2023 03:30 PM    WBCUA 5-9 02/21/2023 03:30 PM    BACTERIA NONE SEEN 02/21/2023 03:30 PM    RBCUA > 200 02/21/2023 03:30 PM    BLOODU LARGE 02/21/2023 03:30 PM    GLUCOSEU 100 02/21/2023 03:30 PM       Radiology:  US RENAL COMPLETE    Result Date: 2/21/2023  Ultrasound renal COMPARISON: No prior FINDINGS: The right kidney was measured at 11.4 cm in length. The left kidney was measured at 11.9 cm in length. Using the liver parenchyma as an internal reference, both kidneys are normal in echogenicity. There is no hydronephrosis and no perinephric collections are seen. The urinary bladder is incompletely fluid distended limiting its evaluation. Normal sized kidneys without hydronephrosis. Please note that this examination is not reliable for evaluation of renal stones. History of right stent placed today. No stent is demonstrated on this examination. Recommend noncontrast CT abdomen and pelvis if further imaging is warranted.  This document has been electronically signed by: Maryam Constantino MD on 02/21/2023 11:31 PM       Consults:   IP CONSULT TO UROLOGY    Discharge Medications:      Medication List        START taking these medications      cefdinir 300 MG capsule  Commonly known as: OMNICEF  Take 1 capsule by mouth 2 times daily for 10 days     phenazopyridine 100 MG tablet  Commonly known as: PYRIDIUM  Take 1 tablet by mouth 3 times daily as needed for Pain     tamsulosin 0.4 MG capsule  Commonly known as: FLOMAX  Take 1 capsule by mouth daily            CONTINUE taking these medications      amLODIPine 5 MG tablet  Commonly known as: NORVASC     celecoxib 200 MG capsule  Commonly known as: CELEBREX     cyanocobalamin 1000 MCG tablet     cycloSPORINE 0.05 % ophthalmic emulsion  Commonly known as: RESTASIS     desvenlafaxine succinate 100 MG Tb24 extended release tablet  Commonly known as: PRISTIQ  Take 1 tablet by mouth daily     fenofibrate 145 MG tablet  Commonly known as: TRICOR     fish oil 1000 MG capsule     magnesium 250 MG Tabs tablet  Commonly known as: MAGNESIUM-OXIDE     olmesartan-hydroCHLOROthiazide 40-25 MG per tablet  Commonly known as: BENICAR HCT     oxybutynin 10 MG extended release tablet  Commonly known as: DITROPAN-XL     pantoprazole 40 MG tablet  Commonly known as: PROTONIX     pravastatin 80 MG tablet  Commonly known as: PRAVACHOL     Vitamin D3 50 MCG (2000 UT) Caps     ZYRTEC ALLERGY PO            STOP taking these medications      aspirin 81 MG tablet     fluticasone 50 MCG/ACT nasal spray  Commonly known as: FLONASE     Lutein-Zeaxanthin 25-5 MG Caps     MAALOX ANTI-GAS PO     ondansetron 4 MG tablet  Commonly known as: ZOFRAN     PROBIOTIC ADVANCED PO     promethazine 25 MG tablet  Commonly known as: PHENERGAN     raNITIdine 150 MG tablet  Commonly known as: ZANTAC     traZODone 50 MG tablet  Commonly known as: DESYREL               Where to Get Your Medications        These medications were sent to [facility] , [address]  [address] [place] [address]      Phone: [phone]   cefdinir 300 MG capsule  phenazopyridine 100 MG tablet  tamsulosin 0.4 MG capsule          Patient Instructions:    Discharge lab work: none  Activity: activity as tolerated and no driving for today  Diet: No diet orders on file      Follow-up visits:   UMER Espinoza CNP  [address]  [phone]    Follow up on 3/2/2023  Follow up appointment March 2, 2023 at 11:30am.    Tavo Avitia MD  [facility] [phone]    Follow up in 1 week(s)  Office will contact patent for a follow up appointment. Disposition: home  Condition at Discharge: Stable    Time Spent: 35 minutes    Signed: Thank you UMER Espinoza CNP for the opportunity to be involved in this patient's care.     Electronically signed by Pam Lake PA-C on 2/25/2023 at 3:54 PM  Discharging Hospitalist

## 2023-02-26 LAB
BACTERIA BLD AEROBE CULT: NORMAL
BACTERIA BLD AEROBE CULT: NORMAL

## 2023-03-02 NOTE — PROGRESS NOTES
PAT call attempted, patient unavailable, left message to please call us back at your earliest convenience; [phone]

## 2023-03-06 RX ORDER — NORTRIPTYLINE HYDROCHLORIDE 25 MG/1
25 CAPSULE ORAL 2 TIMES DAILY PRN
COMMUNITY
Start: 2022-09-29

## 2023-03-06 RX ORDER — ONDANSETRON 4 MG/1
4 TABLET, FILM COATED ORAL EVERY 8 HOURS PRN
COMMUNITY

## 2023-03-06 RX ORDER — HYDROXYPROPYL CELLULOSE 5 MG/1
5 INSERT OPHTHALMIC 2 TIMES DAILY
COMMUNITY
Start: 2021-11-10

## 2023-03-06 RX ORDER — VIT C/HESPERIDIN/BIOFLAVONOIDS 500-100 MG
1 TABLET ORAL
COMMUNITY

## 2023-03-06 RX ORDER — ESTRADIOL 0.1 MG/G
0.5 CREAM VAGINAL
COMMUNITY
Start: 2022-09-01

## 2023-03-06 RX ORDER — VARENICLINE 0.03 MG/.05ML
1 SPRAY NASAL 2 TIMES DAILY
COMMUNITY
Start: 2022-11-02

## 2023-03-06 RX ORDER — FAMOTIDINE 40 MG/1
40 TABLET, FILM COATED ORAL 2 TIMES DAILY
COMMUNITY

## 2023-03-06 RX ORDER — MECLIZINE HYDROCHLORIDE 50 MG/1
50 TABLET ORAL 3 TIMES DAILY PRN
COMMUNITY

## 2023-03-06 NOTE — PROGRESS NOTES
Follow all instructions given by your physician  NPO after midnight   Sips of water am of surgery with allowed medications  Bring insurance info and 's license  Wear comfortable clean, loose fitting clothing  No jewelry or contact lenses to be worn day of surgery  No glue on dentures morning of surgery;you will be asked to remove them for surgery. Case for glasses. Shower night before and morning of surgery with a liquid antibacterial soap, dry with fresh clean towel; no lotions, creams or powder. Clean sheets and pillow case on bed night before surgery  Bring medications in original bottles  Bring CPAP/BIPAP machine if you have one ( you may be charged if one is needed in recovery room )    Please refer to the SSI-Surgical Site Infection Flyer you hopefully received in the mail-together we can prevent infections; signs and symptoms reviewed. When discharged be sure you understand how to care for your wound and that you have the phone # to call if you have any concerns or questions about your wound.  needed at discharge and someone over 18 to stay with you for 24 hours overnight (surgery may be cancelled if you don't have this)  Report to [facility] on 2nd floor  If you would become ill prior to surgery, please call the surgeon  May have a visitor with you, we request that you limit to 2 visitors in pre-op area  Masks are recommended but not required, new masks at entrance desk  Call [phone] for any questions    Covid questionnaire Complete; Patient negative for symptoms or exposure. See documentation.

## 2023-03-07 ENCOUNTER — HOSPITAL ENCOUNTER (OUTPATIENT)
Age: 68
Setting detail: OUTPATIENT SURGERY
Discharge: HOME | End: 2023-03-07
Attending: UROLOGY | Admitting: UROLOGY
Payer: MEDICARE

## 2023-03-07 ENCOUNTER — ANESTHESIA EVENT (OUTPATIENT)
Dept: OPERATING ROOM | Age: 68
End: 2023-03-07
Payer: MEDICARE

## 2023-03-07 ENCOUNTER — ANESTHESIA (OUTPATIENT)
Dept: OPERATING ROOM | Age: 68
End: 2023-03-07
Payer: MEDICARE

## 2023-03-07 VITALS
TEMPERATURE: 96.8 F | OXYGEN SATURATION: 99 % | SYSTOLIC BLOOD PRESSURE: 113 MMHG | BODY MASS INDEX: 25.77 KG/M2 | HEIGHT: 67 IN | DIASTOLIC BLOOD PRESSURE: 54 MMHG | WEIGHT: 164.2 LBS | HEART RATE: 68 BPM | RESPIRATION RATE: 16 BRPM

## 2023-03-07 DIAGNOSIS — N20.0 KIDNEY STONE: Primary | ICD-10-CM

## 2023-03-07 PROCEDURE — 2580000003 HC RX 258: Performed by: UROLOGY

## 2023-03-07 PROCEDURE — 3700000000 HC ANESTHESIA ATTENDED CARE: Performed by: UROLOGY

## 2023-03-07 PROCEDURE — 7100000000 HC PACU RECOVERY - FIRST 15 MIN: Performed by: UROLOGY

## 2023-03-07 PROCEDURE — 2709999900 HC NON-CHARGEABLE SUPPLY: Performed by: UROLOGY

## 2023-03-07 PROCEDURE — 6360000002 HC RX W HCPCS: Performed by: UROLOGY

## 2023-03-07 PROCEDURE — 2500000003 HC RX 250 WO HCPCS

## 2023-03-07 PROCEDURE — 2580000003 HC RX 258

## 2023-03-07 PROCEDURE — 3700000001 HC ADD 15 MINUTES (ANESTHESIA): Performed by: UROLOGY

## 2023-03-07 PROCEDURE — 6360000002 HC RX W HCPCS

## 2023-03-07 PROCEDURE — C1758 CATHETER, URETERAL: HCPCS | Performed by: UROLOGY

## 2023-03-07 PROCEDURE — 3600000013 HC SURGERY LEVEL 3 ADDTL 15MIN: Performed by: UROLOGY

## 2023-03-07 PROCEDURE — 7100000010 HC PHASE II RECOVERY - FIRST 15 MIN: Performed by: UROLOGY

## 2023-03-07 PROCEDURE — C2617 STENT, NON-COR, TEM W/O DEL: HCPCS | Performed by: UROLOGY

## 2023-03-07 PROCEDURE — 7100000011 HC PHASE II RECOVERY - ADDTL 15 MIN: Performed by: UROLOGY

## 2023-03-07 PROCEDURE — 7100000001 HC PACU RECOVERY - ADDTL 15 MIN: Performed by: UROLOGY

## 2023-03-07 PROCEDURE — 2720000010 HC SURG SUPPLY STERILE: Performed by: UROLOGY

## 2023-03-07 PROCEDURE — C1769 GUIDE WIRE: HCPCS | Performed by: UROLOGY

## 2023-03-07 PROCEDURE — 3600000003 HC SURGERY LEVEL 3 BASE: Performed by: UROLOGY

## 2023-03-07 DEVICE — URETERAL STENT
Type: IMPLANTABLE DEVICE | Status: FUNCTIONAL
Brand: PERCUFLEX™ PLUS

## 2023-03-07 RX ORDER — SODIUM CHLORIDE 9 MG/ML
INJECTION, SOLUTION INTRAVENOUS CONTINUOUS PRN
Status: DISCONTINUED | OUTPATIENT
Start: 2023-03-07 | End: 2023-03-07 | Stop reason: SDUPTHER

## 2023-03-07 RX ORDER — HYDRALAZINE HYDROCHLORIDE 20 MG/ML
10 INJECTION INTRAMUSCULAR; INTRAVENOUS
Status: DISCONTINUED | OUTPATIENT
Start: 2023-03-07 | End: 2023-03-07 | Stop reason: HOSPADM

## 2023-03-07 RX ORDER — EPHEDRINE SULFATE/0.9% NACL/PF 50 MG/5 ML
SYRINGE (ML) INTRAVENOUS PRN
Status: DISCONTINUED | OUTPATIENT
Start: 2023-03-07 | End: 2023-03-07 | Stop reason: SDUPTHER

## 2023-03-07 RX ORDER — SODIUM CHLORIDE 0.9 % (FLUSH) 0.9 %
5-40 SYRINGE (ML) INJECTION PRN
Status: DISCONTINUED | OUTPATIENT
Start: 2023-03-07 | End: 2023-03-07 | Stop reason: HOSPADM

## 2023-03-07 RX ORDER — SODIUM CHLORIDE 0.9 % (FLUSH) 0.9 %
5-40 SYRINGE (ML) INJECTION EVERY 12 HOURS SCHEDULED
Status: DISCONTINUED | OUTPATIENT
Start: 2023-03-07 | End: 2023-03-07 | Stop reason: HOSPADM

## 2023-03-07 RX ORDER — FENTANYL CITRATE 50 UG/ML
50 INJECTION, SOLUTION INTRAMUSCULAR; INTRAVENOUS EVERY 5 MIN PRN
Status: DISCONTINUED | OUTPATIENT
Start: 2023-03-07 | End: 2023-03-07 | Stop reason: HOSPADM

## 2023-03-07 RX ORDER — DEXAMETHASONE SODIUM PHOSPHATE 10 MG/ML
INJECTION, EMULSION INTRAMUSCULAR; INTRAVENOUS PRN
Status: DISCONTINUED | OUTPATIENT
Start: 2023-03-07 | End: 2023-03-07 | Stop reason: SDUPTHER

## 2023-03-07 RX ORDER — OXYBUTYNIN CHLORIDE 10 MG/1
10 TABLET, EXTENDED RELEASE ORAL DAILY
Qty: 7 TABLET | Refills: 0 | Status: CANCELLED | OUTPATIENT
Start: 2023-03-07 | End: 2023-03-14

## 2023-03-07 RX ORDER — FENTANYL CITRATE 50 UG/ML
INJECTION, SOLUTION INTRAMUSCULAR; INTRAVENOUS PRN
Status: DISCONTINUED | OUTPATIENT
Start: 2023-03-07 | End: 2023-03-07 | Stop reason: SDUPTHER

## 2023-03-07 RX ORDER — HYDROCODONE BITARTRATE AND ACETAMINOPHEN 5; 325 MG/1; MG/1
1 TABLET ORAL EVERY 6 HOURS PRN
Qty: 18 TABLET | Refills: 0 | Status: CANCELLED | OUTPATIENT
Start: 2023-03-07 | End: 2023-03-12

## 2023-03-07 RX ORDER — ONDANSETRON 2 MG/ML
INJECTION INTRAMUSCULAR; INTRAVENOUS PRN
Status: DISCONTINUED | OUTPATIENT
Start: 2023-03-07 | End: 2023-03-07 | Stop reason: SDUPTHER

## 2023-03-07 RX ORDER — TAMSULOSIN HYDROCHLORIDE 0.4 MG/1
0.4 CAPSULE ORAL DAILY
Qty: 10 CAPSULE | Refills: 0 | Status: CANCELLED | OUTPATIENT
Start: 2023-03-07 | End: 2023-03-17

## 2023-03-07 RX ORDER — NITROFURANTOIN 25; 75 MG/1; MG/1
100 CAPSULE ORAL 2 TIMES DAILY
Qty: 14 CAPSULE | Refills: 0 | Status: CANCELLED | OUTPATIENT
Start: 2023-03-07 | End: 2023-03-14

## 2023-03-07 RX ORDER — SODIUM CHLORIDE 9 MG/ML
INJECTION, SOLUTION INTRAVENOUS PRN
Status: DISCONTINUED | OUTPATIENT
Start: 2023-03-07 | End: 2023-03-07 | Stop reason: HOSPADM

## 2023-03-07 RX ORDER — LABETALOL HYDROCHLORIDE 5 MG/ML
10 INJECTION, SOLUTION INTRAVENOUS
Status: DISCONTINUED | OUTPATIENT
Start: 2023-03-07 | End: 2023-03-07 | Stop reason: HOSPADM

## 2023-03-07 RX ORDER — PROPOFOL 10 MG/ML
INJECTION, EMULSION INTRAVENOUS PRN
Status: DISCONTINUED | OUTPATIENT
Start: 2023-03-07 | End: 2023-03-07 | Stop reason: SDUPTHER

## 2023-03-07 RX ADMIN — DEXAMETHASONE SODIUM PHOSPHATE 5 MG: 10 INJECTION, EMULSION INTRAMUSCULAR; INTRAVENOUS at 08:45

## 2023-03-07 RX ADMIN — PHENYLEPHRINE HYDROCHLORIDE 100 MCG: 10 INJECTION INTRAVENOUS at 08:58

## 2023-03-07 RX ADMIN — PHENYLEPHRINE HYDROCHLORIDE 100 MCG: 10 INJECTION INTRAVENOUS at 09:00

## 2023-03-07 RX ADMIN — Medication 100 MG: at 08:45

## 2023-03-07 RX ADMIN — SODIUM CHLORIDE: 9 INJECTION, SOLUTION INTRAVENOUS at 08:05

## 2023-03-07 RX ADMIN — Medication 10 MG: at 09:03

## 2023-03-07 RX ADMIN — PHENYLEPHRINE HYDROCHLORIDE 100 MCG: 10 INJECTION INTRAVENOUS at 09:05

## 2023-03-07 RX ADMIN — Medication 10 MG: at 08:56

## 2023-03-07 RX ADMIN — PROPOFOL 200 MG: 10 INJECTION, EMULSION INTRAVENOUS at 08:45

## 2023-03-07 RX ADMIN — ONDANSETRON 4 MG: 2 INJECTION INTRAMUSCULAR; INTRAVENOUS at 08:52

## 2023-03-07 RX ADMIN — SODIUM CHLORIDE: 9 INJECTION, SOLUTION INTRAVENOUS at 08:42

## 2023-03-07 RX ADMIN — Medication 2000 MG: at 08:45

## 2023-03-07 RX ADMIN — Medication 10 MG: at 08:51

## 2023-03-07 RX ADMIN — FENTANYL CITRATE 50 MCG: 50 INJECTION, SOLUTION INTRAMUSCULAR; INTRAVENOUS at 08:45

## 2023-03-07 ASSESSMENT — PAIN SCALES - GENERAL
PAINLEVEL_OUTOF10: 2
PAINLEVEL_OUTOF10: 2
PAINLEVEL_OUTOF10: 0

## 2023-03-07 ASSESSMENT — PAIN DESCRIPTION - ORIENTATION: ORIENTATION: LOWER

## 2023-03-07 ASSESSMENT — PAIN DESCRIPTION - LOCATION: LOCATION: ABDOMEN

## 2023-03-07 ASSESSMENT — PAIN - FUNCTIONAL ASSESSMENT: PAIN_FUNCTIONAL_ASSESSMENT: 0-10

## 2023-03-07 ASSESSMENT — PAIN DESCRIPTION - DESCRIPTORS: DESCRIPTORS: PRESSURE

## 2023-03-07 NOTE — H&P
Yvon Bell MD  History and Physical    Patient:  Wiliam Area  MRN: [id number]  YOB: 1955    HISTORY OF PRESENT ILLNESS:     The patient is a 79 y.o. female who presents with kidney stone. Here for procedure. Patient's old records, notes and chart reviewed and summarized above. Yvon Bell MD independently reviewed the images and verified the radiology reports from:    US RENAL COMPLETE    Result Date: 2/21/2023  Ultrasound renal COMPARISON: No prior FINDINGS: The right kidney was measured at 11.4 cm in length. The left kidney was measured at 11.9 cm in length. Using the liver parenchyma as an internal reference, both kidneys are normal in echogenicity. There is no hydronephrosis and no perinephric collections are seen. The urinary bladder is incompletely fluid distended limiting its evaluation. Normal sized kidneys without hydronephrosis. Please note that this examination is not reliable for evaluation of renal stones. History of right stent placed today. No stent is demonstrated on this examination. Recommend noncontrast CT abdomen and pelvis if further imaging is warranted.  This document has been electronically signed by: Juan Ordoñez MD on 02/21/2023 11:31 PM        Past Medical History:    Past Medical History:   Diagnosis Date    Anxiety     Chronic kidney disease     Depression     Diverticulosis     Dry eye syndrome of both eyes     see  at [facility]    EBV infection     Esophageal dilatation 2016    Fractured rib 2016    GERD (gastroesophageal reflux disease)     Heart murmur     Hiatal hernia     Hyperlipidemia     Hypertension        Past Surgical History:    Past Surgical History:   Procedure Laterality Date    BLADDER REPAIR Bilateral 10/18/2022    robotic with BSO at [address] [place] [address]  2006    CHOLECYSTECTOMY      COLONOSCOPY  2012 2012 and 2019    CYSTOSCOPY Right 02/21/2023    CYSTOSCOPY RIGHT URETERAL STENT INSERTION performed by Kayden Francois MD at [place] [facility] FERNANDO Benedict GASTRIC FUNDOPLICATION  28/60/5107    GASTROPLASTY  06/28/2017    HYSTERECTOMY (CERVIX STATUS UNKNOWN)  2005    TONSILLECTOMY AND ADENOIDECTOMY  1994    UPPER GASTROINTESTINAL ENDOSCOPY  2016    with dilation       Medications Prior to Admission:    Prior to Admission medications    Medication Sig Start Date End Date Taking?  Authorizing Provider   meclizine (ANTIVERT) 50 MG tablet Take 50 mg by mouth 3 times daily as needed for Dizziness 50 mg am and 25 mg afternoon and nightly   Yes Historical Provider, MD   ondansetron (ZOFRAN) 4 MG tablet Take 4 mg by mouth every 8 hours as needed for Nausea or Vomiting   Yes Historical Provider, MD   famotidine (PEPCID) 40 MG tablet Take 40 mg by mouth 2 times daily   Yes Historical Provider, MD   Artificial Tear Insert (LACRISERT) 5 MG INST Apply 5 mg to eye 2 times daily 11/10/21  Yes Historical Provider, MD   estradiol (ESTRACE) 0.1 MG/GM vaginal cream Place 0.5 g vaginally Twice a Week 9/1/22  Yes Historical Provider, MD   nortriptyline (PAMELOR) 25 MG capsule Take 25 mg by mouth 2 times daily as needed 9/29/22  Yes Historical Provider, MD   Varenicline Tartrate (TYRVAYA) 0.03 MG/ACT SOLN 1 spray by Nasal route 2 times daily 11/2/22  Yes Historical Provider, MD   Zinc 30 MG TABS Take 1 tablet by mouth Daily with lunch   Yes Historical Provider, MD   Docusate Sodium (DULCOLAX STOOL SOFTENER PO) Take 2 tablets by mouth nightly as needed   Yes Historical Provider, MD   NONFORMULARY nightly Natural sunshine laxative   Yes Historical Provider, MD   NONFORMULARY 1 capsule daily Vitamin B12 plus folate   Yes Historical Provider, MD   MAGNESIUM PO Take 3 capsules by mouth at bedtime   Yes Historical Provider, MD   tamsulosin (FLOMAX) 0.4 MG capsule Take 1 capsule by mouth daily  Patient taking differently: Take 0.4 mg by mouth daily NOON 2/23/23   Lupe Singh PA-C   desvenlafaxine succinate (PRISTIQ) 100 MG TB24 extended release tablet Take 1 tablet by mouth daily  Patient taking differently: Take 100 mg by mouth at bedtime 1/24/20   Elroy Link APRN - CNP   cycloSPORINE (RESTASIS) 0.05 % ophthalmic emulsion Apply 1 drop to eye 2 times daily 12/6/19   Historical Provider, MD   amLODIPine (NORVASC) 5 MG tablet Take 5 mg by mouth daily noon    Historical Provider, MD   Omega-3 Fatty Acids (FISH OIL) 1000 MG CAPS Take 1,000 mg by mouth 4 times daily    Historical Provider, MD   oxybutynin (DITROPAN-XL) 10 MG extended release tablet Take 10 mg by mouth daily    Historical Provider, MD   Cholecalciferol (VITAMIN D3) 2000 UNITS CAPS Take 2 capsules by mouth Daily with supper    Historical Provider, MD   olmesartan-hydrochlorothiazide (BENICAR HCT) 40-25 MG per tablet Take 1 tablet by mouth daily    Historical Provider, MD   celecoxib (CELEBREX) 200 MG capsule Take 200 mg by mouth daily    Historical Provider, MD   fenofibrate (TRICOR) 145 MG tablet Take 145 mg by mouth every evening    Historical Provider, MD   pravastatin (PRAVACHOL) 80 MG tablet Take 80 mg by mouth nightly    Historical Provider, MD   Cetirizine HCl (ZYRTEC ALLERGY PO) Take 10 mg by mouth nightly     Historical Provider, MD       Allergies:  Ativan [lorazepam], Compazine [prochlorperazine maleate], Erythromycin, and Penicillins    Social History:    Social History     Socioeconomic History    Marital status:      Spouse name: Nichol Coleman    Number of children: Not on file    Years of education: Not on file    Highest education level:  Bachelor's degree (e.g., BA, AB, BS)   Occupational History    Not on file   Tobacco Use    Smoking status: Never    Smokeless tobacco: Never   Vaping Use    Vaping Use: Never used   Substance and Sexual Activity    Alcohol use: Yes     Comment: occasional    Drug use: No    Sexual activity: Yes     Partners: Male   Other Topics Concern    Not on file   Social History Narrative    5/16/2019    LEVEL OF EDUCATION: graduated high school; 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 SPECIAL EDUCATION NEEDS: None    RESIDENCE: Currently lives     LEGAL HISTORY: None    Pentecostal: [employer]ism but attends [employer]    TRAUMA: job losses; her mother  suddenly at home in  when patient was age 21; her brother  from injuries in a MVA in 1 4 months after their mother's death    : None    HOBBIES: snow skiing; walking; biking; swimming    EMPLOYMENT: currently employed at [employer]. 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 Worked at [employer] from [id number] as . Then returned to faculty at [employer] in  and is now working full-time. Worked at [employer] from  - 2017. States she worked at [employer] [id number]. She states \"I think they let me go because I was over on my FMLA. \" They were also downsizing per her report. States she worked at [employer] from 2007 through Dec. 2010. MARRIAGES: once.  She and her   Oct. 16, 1976    CHILDREN: 2 adult daughters    SUBSTANCE USE: None     Social Determinants of Health     Financial Resource Strain: Not on file   Food Insecurity: Not on file   Transportation Needs: Not on file   Physical Activity: Not on file   Stress: Not on file   Social Connections: Not on file   Intimate Partner Violence: Not on file   Housing Stability: Not on file       Family History:    Family History   Problem Relation Age of Onset    Heart Disease Mother 55    Depression Mother     Other Father [address]        myeloidosis    Colon Polyps Father [de-identified]    Kidney Disease Father     Alcohol Abuse Father     Thyroid Cancer Sister 24    Ovarian Cancer Sister 48    Kidney Cancer Paternal Grandmother [de-identified]    Other Sister         chronic fatigue; eating disorder    Hypertension Sister     Colon Cancer Neg Hx        REVIEW OF SYSTEMS:  Constitutional: negative  Eyes: negative  Respiratory: negative  Cardiovascular: negative  Gastrointestinal: negative  Genitourinary: no acute issues  Musculoskeletal: negative  Skin: negative Neurological: negative  Hematological/Lymphatic: negative  Psychological: negative    Physical Exam:      No data found. Constitutional: Patient in no acute distress; Neuro: alert and oriented to person place and time. Psych: Mood and affect normal.  Skin: Normal  Lungs: Respiratory effort normal, CTA  Cardiovascular:  Normal peripheral pulses; no murmur. Normal rhythm  Abdomen: Soft, non-tender, non-distended with no CVA, flank pain, hepatosplenomegaly or hernia. Kidneys normal.  Bladder non-tender and not distended. LABS:   No results for input(s): WBC, HGB, HCT, MCV, PLT in the last 72 hours. No results for input(s): NA, K, CL, CO2, PHOS, BUN, CREATININE, CA in the last 72 hours. No results found for: PSA      Urinalysis: No results for input(s): COLORU, PHUR, LABCAST, WBCUA, RBCUA, MUCUS, TRICHOMONAS, YEAST, BACTERIA, CLARITYU, SPECGRAV, LEUKOCYTESUR, UROBILINOGEN, Fleta Balls in the last 72 hours.     Invalid input(s): NITRATE, GLUCOSEUKETONESUAMORPHOUS     -----------------------------------------------------------------      Assessment and Plan     Impression:    Patient Active Problem List   Diagnosis    Epigastric pain    Change in bowel habits    Essential hypertension    Tired    Kidney stone    Complicated UTI (urinary tract infection)       Plan:     Consent obtained; right ureteroscopy in OR today    Lai Good MD  6:33 AM 3/7/2023

## 2023-03-07 NOTE — ANESTHESIA PRE PROCEDURE
Department of Anesthesiology  Preprocedure Note       Name:  Peggy Mayfield   Age:  79 y.o.  :  1955                                          MRN:  [id number]         Date:  3/7/2023      Surgeon: Ranjan Corona):  Marianela Dunham MD    Procedure: Procedure(s):  CYSTOSCOPY, RIGHT URETEROSCOPY, LASER LITHOTRIPSY, BASKET RETRIEVAL OF STONE FRAGMENTS, POSS RIGHT URETERAL STENT EXCHANGE    Medications prior to admission:   Prior to Admission medications    Medication Sig Start Date End Date Taking?  Authorizing Provider   meclizine (ANTIVERT) 50 MG tablet Take 50 mg by mouth 3 times daily as needed for Dizziness 50 mg am and 25 mg afternoon and nightly   Yes Historical Provider, MD   ondansetron (ZOFRAN) 4 MG tablet Take 4 mg by mouth every 8 hours as needed for Nausea or Vomiting   Yes Historical Provider, MD   famotidine (PEPCID) 40 MG tablet Take 40 mg by mouth 2 times daily   Yes Historical Provider, MD   Artificial Tear Insert (LACRISERT) 5 MG INST Apply 5 mg to eye 2 times daily 11/10/21  Yes Historical Provider, MD   estradiol (ESTRACE) 0.1 MG/GM vaginal cream Place 0.5 g vaginally Twice a Week 22  Yes Historical Provider, MD   nortriptyline (PAMELOR) 25 MG capsule Take 25 mg by mouth 2 times daily as needed 22  Yes Historical Provider, MD   Varenicline Tartrate (TYRVAYA) 0.03 MG/ACT SOLN 1 spray by Nasal route 2 times daily 22  Yes Historical Provider, MD   Zinc 30 MG TABS Take 1 tablet by mouth Daily with lunch   Yes Historical Provider, MD   Docusate Sodium (DULCOLAX STOOL SOFTENER PO) Take 2 tablets by mouth nightly as needed   Yes Historical Provider, MD   NONFORMULARY nightly Natural sunshine laxative   Yes Historical Provider, MD   NONFORMULARY 1 capsule daily Vitamin B12 plus folate   Yes Historical Provider, MD   MAGNESIUM PO Take 3 capsules by mouth at bedtime   Yes Historical Provider, MD   tamsulosin (FLOMAX) 0.4 MG capsule Take 1 capsule by mouth daily  Patient taking differently: Take 0.4 mg by mouth daily NOON 2/23/23   Blaine Rock PA-C   desvenlafaxine succinate (PRISTIQ) 100 MG TB24 extended release tablet Take 1 tablet by mouth daily  Patient taking differently: Take 100 mg by mouth at bedtime 1/24/20   UMER Danielle - CNP   cycloSPORINE (RESTASIS) 0.05 % ophthalmic emulsion Apply 1 drop to eye 2 times daily 12/6/19   Historical Provider, MD   amLODIPine (NORVASC) 5 MG tablet Take 5 mg by mouth daily noon    Historical Provider, MD   Omega-3 Fatty Acids (FISH OIL) 1000 MG CAPS Take 1,000 mg by mouth 4 times daily    Historical Provider, MD   oxybutynin (DITROPAN-XL) 10 MG extended release tablet Take 10 mg by mouth daily    Historical Provider, MD   Cholecalciferol (VITAMIN D3) 2000 UNITS CAPS Take 2 capsules by mouth Daily with supper    Historical Provider, MD   olmesartan-hydrochlorothiazide (BENICAR HCT) 40-25 MG per tablet Take 1 tablet by mouth daily    Historical Provider, MD   celecoxib (CELEBREX) 200 MG capsule Take 200 mg by mouth daily    Historical Provider, MD   fenofibrate (TRICOR) 145 MG tablet Take 145 mg by mouth every evening    Historical Provider, MD   pravastatin (PRAVACHOL) 80 MG tablet Take 80 mg by mouth nightly    Historical Provider, MD   Cetirizine HCl (ZYRTEC ALLERGY PO) Take 10 mg by mouth nightly     Historical Provider, MD       Current medications:    Current Facility-Administered Medications   Medication Dose Route Frequency Provider Last Rate Last Admin    sodium chloride flush 0.9 % injection 5-40 mL  5-40 mL IntraVENous 2 times per day Braden Rao MD        sodium chloride flush 0.9 % injection 5-40 mL  5-40 mL IntraVENous PRN Braden Rao MD        0.9 % sodium chloride infusion   IntraVENous PRN Braden Rao  mL/hr at 03/07/23 0805 New Bag at 03/07/23 0805    ceFAZolin (ANCEF) 2000 mg in 0.9% sodium chloride 50 mL IVPB  2,000 mg IntraVENous On Call to [address] [place], MD           Allergies:     Allergies   Allergen Reactions    Ativan [Lorazepam] Other (See Comments)     Dependency in the past    Compazine [Prochlorperazine Maleate] Other (See Comments)     \"I had the EPS symptoms\"    Erythromycin Diarrhea and Nausea And Vomiting    Penicillins Other (See Comments)     Uknown - the reaction was as a child       Problem List:    Patient Active Problem List   Diagnosis Code    Epigastric pain R10.13    Change in bowel habits R19.4    Essential hypertension I10    Tired R53.83    Kidney stone F92.1    Complicated UTI (urinary tract infection) N39.0       Past Medical History:        Diagnosis Date    Anxiety     Chronic kidney disease     Depression     Diverticulosis     Dry eye syndrome of both eyes     see  at [id number] [facility] EBV infection     Esophageal dilatation 2016    Fractured rib 2016    GERD (gastroesophageal reflux disease)     Heart murmur     Hiatal hernia     Hyperlipidemia     Hypertension        Past Surgical History:        Procedure Laterality Date    BLADDER REPAIR Bilateral 10/18/2022    robotic with BSO at [facility]  2006    CHOLECYSTECTOMY      COLONOSCOPY  2012 2012 and 2019    CYSTOSCOPY Right 02/21/2023    CYSTOSCOPY RIGHT URETERAL STENT INSERTION performed by Yoselin Grady MD at [place] [address]  40/07/4072    GASTROPLASTY  06/28/2017    HYSTERECTOMY (CERVIX STATUS UNKNOWN)  2005    TONSILLECTOMY AND ADENOIDECTOMY  1994    UPPER GASTROINTESTINAL ENDOSCOPY  2016    with dilation       Social History:    Social History     Tobacco Use    Smoking status: Never    Smokeless tobacco: Never   Substance Use Topics    Alcohol use: Yes     Comment: occasional                                Counseling given: Not Answered      Vital Signs (Current):   Vitals:    03/07/23 0731 03/07/23 0737   BP: (!) 112/52    Pulse: 78    Resp: 16    Temp: 97.9 °F (36.6 °C)    TempSrc: Temporal    SpO2: 98%    Weight:  164 lb 3.2 oz (74.5 kg)   Height:  5' 6.5\" (1.689 m)                        BP Readings from Last 3 Encounters:   03/07/23 (!) 112/52   02/22/23 135/72   07/30/19 122/72       NPO Status: Time of last liquid consumption: 0645 (sip of water with medication this am)                        Time of last solid consumption: 2330                        Date of last liquid consumption: 03/07/23                        Date of last solid food consumption: 03/06/23    BMI:   Wt Readings from Last 3 Encounters:   03/07/23 164 lb 3.2 oz (74.5 kg)   02/22/23 168 lb 12.8 oz (76.6 kg)   07/30/19 179 lb 6.4 oz (81.4 kg)     Body mass index is 26.11 kg/m².    CBC:   Lab Results   Component Value Date/Time    WBC 5.3 02/22/2023 05:54 AM    RBC 3.56 02/22/2023 05:54 AM    RBC 3.88 11/27/2019 10:30 AM    HGB 10.8 02/22/2023 05:54 AM    HCT 32.8 02/22/2023 05:54 AM    MCV 92.1 02/22/2023 05:54 AM    RDW 12.8 11/27/2019 10:30 AM     02/22/2023 05:54 AM       CMP:   Lab Results   Component Value Date/Time     02/21/2023 11:15 AM    K 3.8 02/21/2023 11:15 AM     02/21/2023 11:15 AM    CO2 25 02/21/2023 11:15 AM    BUN 16 02/21/2023 11:15 AM    CREATININE 0.9 02/21/2023 11:15 AM    LABGLOM >60 02/21/2023 11:15 AM    GLUCOSE 102 02/21/2023 11:15 AM    PROT 6.3 02/21/2023 11:15 AM    CALCIUM 9.2 02/21/2023 11:15 AM    BILITOT 0.4 02/21/2023 11:15 AM    ALKPHOS 44 02/21/2023 11:15 AM    AST 45 02/21/2023 11:15 AM    ALT 24 02/21/2023 11:15 AM       POC Tests: No results for input(s): POCGLU, POCNA, POCK, POCCL, POCBUN, POCHEMO, POCHCT in the last 72 hours.    Coags: No results found for: PROTIME, INR, APTT    HCG (If Applicable): No results found for: PREGTESTUR, PREGSERUM, HCG, HCGQUANT     ABGs: No results found for: PHART, PO2ART, HOU7RWY, ZUV3SSS, BEART, T9EVSHZN     Type & Screen (If Applicable):  No results found for: LABABO, LABRH    Drug/Infectious Status (If Applicable):  No results found for: HIV, HEPCAB    COVID-19 Screening (If Applicable): No results found for:  COVID19        Anesthesia Evaluation  Patient summary reviewed  Airway: Mallampati: II  TM distance: >3 FB   Neck ROM: full  Mouth opening: > = 3 FB   Dental:          Pulmonary:                              Cardiovascular:    (+) hypertension:,                   Neuro/Psych:   (+) psychiatric history:            GI/Hepatic/Renal:   (+) hiatal hernia, GERD:,           Endo/Other:                     Abdominal:             Vascular: Other Findings:           Anesthesia Plan      general     ASA 2       Induction: intravenous and rapid sequence. MIPS: Postoperative opioids intended and Prophylactic antiemetics administered. Anesthetic plan and risks discussed with patient and spouse. Plan discussed with TONE. Carole Dumont.  [address] [place] [address],    3/7/2023

## 2023-03-07 NOTE — PROGRESS NOTES
0912 pt. Awake and oriented on arrival to pacu. Pt. Denies pain. 0920  pt. Continues to converse without complaints. 0186  pacu criteria met. Pt. Stable with no complaints. Transfer to [facility].

## 2023-03-07 NOTE — DISCHARGE INSTRUCTIONS
Pt should Pull stent in 5 days. There may be some pain associated with the stent removal, which is usually self-limiting. We suggest using the pain medication prescribed for you and a nonsteroidal anti-inflammatory such as Ibuprofen, if you are able to take this medication, to control symptoms. Please stay hydrated. Please call with questions. May resume medications  Pt ok to discharge home in good condition  No heavy lifting, >10 lbs for today  Pt should avoid strenuous activity for today  Pt should walk moderately at home  Pt ok to shower   Pt may resume diet as tolerated  Pt should take Rx as directed  No driving while on narcotics  Please call attending physician or hospital  with questions  Call or Present to ED if fever (> 101F), intractable nausea vomiting or pain.   Rx in chart    Pt should follow up with Román Alcala MD, in 6 weeks, call to confirm appointment

## 2023-03-07 NOTE — BRIEF OP NOTE
Brief Postoperative Note      Patient: Kayla Velez  YOB: 1955  MRN: [id number]    Date of Procedure: 3/7/2023    Pre-Op Diagnosis: Kidney stone [N20.0]    Post-Op Diagnosis: Same       Procedure(s):  CYSTOSCOPY, RIGHT URETEROSCOPY, LASER LITHOTRIPSY, BASKET RETRIEVAL OF STONE FRAGMENTS, POSS RIGHT URETERAL STENT EXCHANGE    Surgeon(s):  Deangelo Monique MD    Assistant:  * No surgical staff found *    Anesthesia: General    Estimated Blood Loss (mL): Minimal    Complications: None    Specimens:   * No specimens in log *    Implants:  Implant Name Type Inv. Item Serial No.  Lot No. LRB No. Used Action   STENT URET 6FR L26CM HYDR+ PGTL [facility] [id number]  [facility] 6FR L26CM HYDR+ PGTL TAPR TIP GRAD BLDR MRK LO  [employer] [facility]- [id number] Right 1 Implanted         Drains: * No LDAs found *    Findings: stone treated.  F/u ashley with litholink in 6 weeks     Electronically signed by Casa Bustillo MD on 3/7/2023 at 11:21 AM

## 2023-03-07 NOTE — PROGRESS NOTES
Pt admitted to [facility] room 10 and oriented to unit. SCD sleeves applied. Nares swabbed. Pt verbalized permission for first name, last initial and physicians name on white board. SDS board and discharge criteria explained, pt and family verbalized understanding. Pt denies thoughts of harming self or others. Call light in reach. Family at the bedside.

## 2023-03-07 NOTE — ANESTHESIA POSTPROCEDURE EVALUATION
Department of Anesthesiology  Postprocedure Note    Patient: Kevin Day  MRN: [id number]  YOB: 1955  Date of evaluation: 3/7/2023      Procedure Summary     Date: 03/07/23 Room / Location: [facility] / [address]    Anesthesia Start: 4472 Anesthesia Stop: 7183    Procedure: CYSTOSCOPY, RIGHT URETEROSCOPY, LASER LITHOTRIPSY, BASKET RETRIEVAL OF STONE FRAGMENTS, POSS RIGHT URETERAL STENT EXCHANGE (Right) Diagnosis:       Kidney stone      (Kidney stone [N20.0])    Surgeons: Ericka Davidson MD Responsible Provider: Phoebe Buckley DO    Anesthesia Type: General ASA Status: 2          Anesthesia Type: General    Manny Phase I: Manny Score: 10    Manny Phase II: Manny Score: 10      Anesthesia Post Evaluation    Patient location during evaluation: bedside  Patient participation: complete - patient participated  Level of consciousness: awake  Airway patency: patent  Nausea & Vomiting: no vomiting and no nausea  Cardiovascular status: hemodynamically stable  Respiratory status: acceptable  Hydration status: stable

## 2023-03-19 NOTE — OP NOTE
[address] [place] [address]. [place]    DATE: 3/7/2023  Patient:  Daryn Craig  MRN: [id number]  YOB: 1955    SURGEON: Lisha Tate MD.    ASSISTANT: none    PREOPERATIVE DIAGNOSIS: right ureteral stone      POSTOPERATIVE DIAGNOSIS:  right ureteral stone      PROCEDURE PERFORMED: cystoscopy, right ureteroscopy right holmium laser lithotripsy right stone basketing right stent exchange    ANESTHESIA: General    COMPLICATIONS: none    OR BLOOD LOSS:  Minimal    FLUIDS: Cystalloids per Anesthesia    SPECIMENS:  * No specimens in log *  none    DRAINS: 6 x 26 dbl j stent    INDICATIONS FOR PROCEDURE:  The patient is a 79 y.o. female who presents today with Kidney stone [N20.0] here for CYSTOSCOPY, RIGHT URETEROSCOPY, LASER LITHOTRIPSY, BASKET RETRIEVAL OF STONE FRAGMENTS, POSS RIGHT URETERAL STENT EXCHANGE. After risks, benefits and alternatives of the procedure were discussed with the patient, the patient elected to proceed. DETAILS OF PROCEDURE:  After informed consent was obtained in the preoperative area, the patient was taken back to the operating room and transferred to the operating table in supine position. Anesthesia was induced and antibiotics were given. The patient was placed in modified dorsal lithotomy position and sterilely prepped and draped in a standard fashion. A timeout occurred. Two patient identifiers were used. We entered the urethra with a 22 [place] Tamara scope. We focused our attention on the right ureteral orifice. The stent was seen emanating from the ureteral orifice. It was grasped using a foreign body grasper and brought to the ureteral meatus. A wire was placed through the stent into the kidney under fluoroscopic guidance. The stent was removed, and a dual lumen catheter was used to place a second wire into the kidney under fluoroscopic guidance.      The rigid ureteroscope was assembled, placed next to the [address] [place] [address], and

## 2023-05-17 ENCOUNTER — TELEPHONE (OUTPATIENT)
Dept: UROLOGY | Age: 68
End: 2023-05-17

## 2023-05-17 ENCOUNTER — HOSPITAL ENCOUNTER (OUTPATIENT)
Dept: CT IMAGING | Age: 68
Discharge: HOME | End: 2023-05-17

## 2023-05-17 ENCOUNTER — OFFICE VISIT (OUTPATIENT)
Dept: UROLOGY | Age: 68
End: 2023-05-17
Payer: MEDICARE

## 2023-05-17 VITALS — HEIGHT: 66 IN | RESPIRATION RATE: 18 BRPM | BODY MASS INDEX: 27.32 KG/M2 | WEIGHT: 170 LBS

## 2023-05-17 DIAGNOSIS — Z00.6 EXAMINATION FOR NORMAL COMPARISON FOR CLINICAL RESEARCH: ICD-10-CM

## 2023-05-17 DIAGNOSIS — N20.0 KIDNEY STONE: Primary | ICD-10-CM

## 2023-05-17 LAB
BILIRUBIN, POC: NORMAL
BLOOD URINE, POC: NORMAL
CLARITY, POC: CLEAR
COLOR, POC: YELLOW
GLUCOSE URINE, POC: NORMAL
KETONES, POC: NORMAL
LEUKOCYTE EST, POC: NORMAL
NITRITE, POC: NORMAL
PH, POC: 7
PROTEIN, POC: NORMAL
SPECIFIC GRAVITY, POC: 1.01
UROBILINOGEN, POC: 0.2

## 2023-05-17 PROCEDURE — G8419 CALC BMI OUT NRM PARAM NOF/U: HCPCS | Performed by: NURSE PRACTITIONER

## 2023-05-17 PROCEDURE — 81003 URINALYSIS AUTO W/O SCOPE: CPT | Performed by: NURSE PRACTITIONER

## 2023-05-17 PROCEDURE — 1036F TOBACCO NON-USER: CPT | Performed by: NURSE PRACTITIONER

## 2023-05-17 PROCEDURE — 1090F PRES/ABSN URINE INCON ASSESS: CPT | Performed by: NURSE PRACTITIONER

## 2023-05-17 PROCEDURE — G8427 DOCREV CUR MEDS BY ELIG CLIN: HCPCS | Performed by: NURSE PRACTITIONER

## 2023-05-17 PROCEDURE — 99203 OFFICE O/P NEW LOW 30 MIN: CPT | Performed by: NURSE PRACTITIONER

## 2023-05-17 PROCEDURE — 3017F COLORECTAL CA SCREEN DOC REV: CPT | Performed by: NURSE PRACTITIONER

## 2023-05-17 PROCEDURE — G8400 PT W/DXA NO RESULTS DOC: HCPCS | Performed by: NURSE PRACTITIONER

## 2023-05-17 PROCEDURE — 1123F ACP DISCUSS/DSCN MKR DOCD: CPT | Performed by: NURSE PRACTITIONER

## 2023-05-17 ASSESSMENT — ENCOUNTER SYMPTOMS
NAUSEA: 0
ABDOMINAL PAIN: 0
VOMITING: 0
BACK PAIN: 0

## 2023-05-17 NOTE — PROGRESS NOTES
[address] [address]  Dept: [phone]  Loc: [phone]    Visit Date: 5/17/2023        HPI:     Theresa Lopez is a 79 y.o. female who presents today for:  Chief Complaint   Patient presents with    Nephrolithiasis    Results     Litholink results        HPI  Pt seen in follow up for kidney stones. Sylvia Blake has a hx of kidney stones with a septic stone in 2021. Admitted to [facility] in Feb 2023 secondary to R UVJ calculus in setting of UTI and underwent cystoscopy with right ureteral stent placement on 2/21/23 by Dr. Juan Gaspar with subsequent R ureteroscopic treatment of kidney stone 3/7/23 by Dr. Juan Gaspar. String stent removed by pt. Pt completed Litholink prior to today's appt. Reports she is doing well following surgery. No recent symptoms of stones.       Current Outpatient Medications   Medication Sig Dispense Refill    ondansetron (ZOFRAN) 4 MG tablet Take 1 tablet by mouth every 8 hours as needed for Nausea or Vomiting      famotidine (PEPCID) 40 MG tablet Take 1 tablet by mouth 2 times daily      estradiol (ESTRACE) 0.1 MG/GM vaginal cream Place 0.5 g vaginally Twice a Week      nortriptyline (PAMELOR) 25 MG capsule Take 1 capsule by mouth 2 times daily as needed      Varenicline Tartrate (TYRVAYA) 0.03 MG/ACT SOLN 1 spray by Nasal route 2 times daily      Zinc 30 MG TABS Take 1 tablet by mouth Daily with lunch      Docusate Sodium (DULCOLAX STOOL SOFTENER PO) Take 2 tablets by mouth nightly as needed      NONFORMULARY nightly Natural sunshine laxative      NONFORMULARY 1 capsule daily Vitamin B12 plus folate      MAGNESIUM PO Take 3 capsules by mouth at bedtime      desvenlafaxine succinate (PRISTIQ) 100 MG TB24 extended release tablet Take 1 tablet by mouth daily (Patient taking differently: Take 1 tablet by mouth at bedtime) 30 tablet 1    cycloSPORINE (RESTASIS) 0.05 % ophthalmic emulsion Apply 1 drop to

## 2023-05-17 NOTE — TELEPHONE ENCOUNTER
prior CT report and PACs images from [facility] from February were requested today

## 2023-05-17 NOTE — PATIENT INSTRUCTIONS
Kidney Stone Prevention    Kidney/Ureteral stones are formed by the normal chemicals which are present in the urine. If the urine gets super concentrated by any of the common ingredients e.g. Calcium, uric acid, oxalate, or phosphate, the stone is formed. The three most important changes, which you should make to your diet to prevent recurrence are as follows:      Drink at least 3 quarts of water a day, and even more in hot weather. You need to drink enough water to make at least two quarts of urine per day. Limit your salt intake to no more than 3 grams per day. Remember that most of the processed foods are very high in sodium (canned, boxed, frozen, restaurant foods). When you eat large amounts of salty foods/snacks the kidney will eliminate that excess salt along with calcium, which is the most common composition of kidney stones. You do not have to limit your calcium intake (up to 1000 mg). Natural foods (low fat dairy products) as the source of calcium are preferred over pills and may in fact prevent stones. Limit your animal protein (meat, fish, poultry). Try to limit meat consumption to no more than 8 ounces a day. Kidney stones are more common in people consuming large amounts of animal proteins, especially red meat. Increase vegetables/fruit consumption. Other strategies for stone prevention:  Citric acid in urine prevents stone crystallization. Increase citrate consumption by drinking orange juice, lemonade, or diluted lemon juice. One good and inexpensive way to achieve this goal is by mixing 4 ounces of lemon juice in 2 liters of water to be used in a 24-hour period. Limit oxalate consumption. Oxalate is one of the most common contents of kidney stones. Foods rich in oxalates are all types of nuts, tea, spinach, rhubarb, cranberry, chocolate, and black pepper.       Risk factors for stone formation:  If your medical history includes gastric bypass surgery or resection of a

## 2023-11-15 ENCOUNTER — OFFICE VISIT (OUTPATIENT)
Dept: UROLOGY | Age: 68
End: 2023-11-15
Payer: MEDICARE

## 2023-11-15 VITALS
WEIGHT: 177 LBS | HEIGHT: 66 IN | BODY MASS INDEX: 28.45 KG/M2 | DIASTOLIC BLOOD PRESSURE: 86 MMHG | SYSTOLIC BLOOD PRESSURE: 130 MMHG

## 2023-11-15 DIAGNOSIS — N20.0 KIDNEY STONE: Primary | ICD-10-CM

## 2023-11-15 DIAGNOSIS — R31.29 MICROSCOPIC HEMATURIA: ICD-10-CM

## 2023-11-15 LAB
BACTERIA: NORMAL
BILIRUB UR QL STRIP: NEGATIVE
BILIRUBIN, POC: NEGATIVE
BLOOD URINE, POC: NORMAL
CASTS #/AREA URNS LPF: NORMAL /LPF
CASTS #/AREA URNS LPF: NORMAL /LPF
CHARACTER UR: CLEAR
CHARCOAL URNS QL MICRO: NORMAL
CLARITY, POC: CLEAR
COLOR UR: NORMAL
COLOR, POC: YELLOW
CRYSTALS URNS QL MICRO: NORMAL
EPITHELIAL CELLS, UA: NORMAL /HPF
GLUCOSE UR QL STRIP.AUTO: NEGATIVE MG/DL
GLUCOSE URINE, POC: NEGATIVE
HGB UR QL STRIP.AUTO: NEGATIVE
KETONES UR QL STRIP.AUTO: NEGATIVE
KETONES, POC: NEGATIVE
LEUKOCYTE EST, POC: NEGATIVE
LEUKOCYTE ESTERASE UR QL STRIP.AUTO: NEGATIVE
NITRITE UR QL STRIP.AUTO: NEGATIVE
NITRITE, POC: NEGATIVE
PH UR STRIP.AUTO: 8 [PH] (ref 5–9)
PH, POC: 7.5
PROT UR STRIP.AUTO-MCNC: NEGATIVE MG/DL
PROTEIN, POC: NEGATIVE
RBC #/AREA URNS HPF: NORMAL /HPF
RENAL EPI CELLS #/AREA URNS HPF: NORMAL /[HPF]
SPECIFIC GRAVITY UA: 1.01 (ref 1–1.03)
SPECIFIC GRAVITY, POC: 1.01
UROBILINOGEN, POC: 0.2
UROBILINOGEN, URINE: 0.2 EU/DL (ref 0–1)
WBC #/AREA URNS HPF: NORMAL /HPF
YEAST LIKE FUNGI URNS QL MICRO: NORMAL

## 2023-11-15 PROCEDURE — 1123F ACP DISCUSS/DSCN MKR DOCD: CPT | Performed by: NURSE PRACTITIONER

## 2023-11-15 PROCEDURE — 1036F TOBACCO NON-USER: CPT | Performed by: NURSE PRACTITIONER

## 2023-11-15 PROCEDURE — 3079F DIAST BP 80-89 MM HG: CPT | Performed by: NURSE PRACTITIONER

## 2023-11-15 PROCEDURE — 3075F SYST BP GE 130 - 139MM HG: CPT | Performed by: NURSE PRACTITIONER

## 2023-11-15 PROCEDURE — G8400 PT W/DXA NO RESULTS DOC: HCPCS | Performed by: NURSE PRACTITIONER

## 2023-11-15 PROCEDURE — 99213 OFFICE O/P EST LOW 20 MIN: CPT | Performed by: NURSE PRACTITIONER

## 2023-11-15 PROCEDURE — 81003 URINALYSIS AUTO W/O SCOPE: CPT | Performed by: NURSE PRACTITIONER

## 2023-11-15 PROCEDURE — G8484 FLU IMMUNIZE NO ADMIN: HCPCS | Performed by: NURSE PRACTITIONER

## 2023-11-15 PROCEDURE — 3017F COLORECTAL CA SCREEN DOC REV: CPT | Performed by: NURSE PRACTITIONER

## 2023-11-15 PROCEDURE — 1090F PRES/ABSN URINE INCON ASSESS: CPT | Performed by: NURSE PRACTITIONER

## 2023-11-15 PROCEDURE — G8419 CALC BMI OUT NRM PARAM NOF/U: HCPCS | Performed by: NURSE PRACTITIONER

## 2023-11-15 PROCEDURE — G8427 DOCREV CUR MEDS BY ELIG CLIN: HCPCS | Performed by: NURSE PRACTITIONER

## 2023-11-15 ASSESSMENT — ENCOUNTER SYMPTOMS
NAUSEA: 0
BACK PAIN: 0
ABDOMINAL PAIN: 0
VOMITING: 0

## 2023-11-15 NOTE — PROGRESS NOTES
left CVA tenderness. Musculoskeletal:         General: No tenderness. Normal range of motion. Skin:     General: Skin is warm and dry. Neurological:      Mental Status: She is alert and oriented to person, place, and time. Mental status is at baseline. Psychiatric:         Mood and Affect: Mood normal.         Behavior: Behavior normal.         Thought Content: Thought content normal.       POC  Results for POC orders placed in visit on 11/15/23   POCT Urinalysis No Micro (Auto)   Result Value Ref Range    Color, UA Yellow     Clarity, UA Clear     Glucose, UA POC Negative     Bilirubin, UA Negative     Ketones, UA Negative     Spec Grav, UA 1.015     Blood, UA POC Trace-intact     pH, UA 7.5     Protein, UA POC Negative     Urobilinogen, UA 0.2     Leukocytes, UA Negative     Nitrite, UA Negative        Patients recent PSA values are as follows  No results found for: \"PSA\", \"PSADIA\"     Recent BUN/Creatinine:  Lab Results   Component Value Date/Time    BUN 16 02/21/2023 11:15 AM    CREATININE 0.9 02/21/2023 11:15 AM         Assessment:   Kidney stones  Hx septic stone in the past    Plan:     Pt doing well. No symptoms of stones at this time. Discussed kidney stone diet. Pt will continue with changes to diet. Check KUB. If stable, no new stones will likely f/u in 1 year with kub at that time. KUB now--call results.

## 2023-11-17 LAB
BACTERIA UR CULT: ABNORMAL
ORGANISM: ABNORMAL

## 2023-12-04 ENCOUNTER — HOSPITAL ENCOUNTER (OUTPATIENT)
Dept: GENERAL RADIOLOGY | Age: 68
Discharge: HOME | End: 2023-12-04
Payer: MEDICARE

## 2023-12-04 ENCOUNTER — HOSPITAL ENCOUNTER (OUTPATIENT)
Age: 68
Discharge: HOME | End: 2023-12-04
Payer: MEDICARE

## 2023-12-04 DIAGNOSIS — N20.0 KIDNEY STONE: ICD-10-CM

## 2023-12-04 PROCEDURE — 74018 RADEX ABDOMEN 1 VIEW: CPT

## 2023-12-07 DIAGNOSIS — N20.0 KIDNEY STONES: Primary | ICD-10-CM

## 2024-02-12 ENCOUNTER — NURSE ONLY (OUTPATIENT)
Dept: LAB | Age: 69
End: 2024-02-12

## 2024-02-12 LAB
ALBUMIN SERPL BCG-MCNC: 4.4 G/DL (ref 3.5–5.1)
ALP SERPL-CCNC: 57 U/L (ref 38–126)
ALT SERPL W/O P-5'-P-CCNC: 14 U/L (ref 11–66)
ANION GAP SERPL CALC-SCNC: 13 MEQ/L (ref 8–16)
AST SERPL-CCNC: 18 U/L (ref 5–40)
BILIRUB SERPL-MCNC: 0.2 MG/DL (ref 0.3–1.2)
BUN SERPL-MCNC: 20 MG/DL (ref 7–22)
CALCIUM SERPL-MCNC: 9.6 MG/DL (ref 8.5–10.5)
CHLORIDE SERPL-SCNC: 102 MEQ/L (ref 98–111)
CO2 SERPL-SCNC: 26 MEQ/L (ref 23–33)
CREAT SERPL-MCNC: 0.9 MG/DL (ref 0.4–1.2)
DEPRECATED RDW RBC AUTO: 41.4 FL (ref 35–45)
ERYTHROCYTE [DISTWIDTH] IN BLOOD BY AUTOMATED COUNT: 12.1 % (ref 11.5–14.5)
GFR SERPL CREATININE-BSD FRML MDRD: > 60 ML/MIN/1.73M2
GLUCOSE SERPL-MCNC: 94 MG/DL (ref 70–108)
HCT VFR BLD AUTO: 37.2 % (ref 37–47)
HGB BLD-MCNC: 12.3 GM/DL (ref 12–16)
MCH RBC QN AUTO: 30.7 PG (ref 26–33)
MCHC RBC AUTO-ENTMCNC: 33.1 GM/DL (ref 32.2–35.5)
MCV RBC AUTO: 92.8 FL (ref 81–99)
PLATELET # BLD AUTO: 353 THOU/MM3 (ref 130–400)
PMV BLD AUTO: 10.6 FL (ref 9.4–12.4)
POTASSIUM SERPL-SCNC: 4.6 MEQ/L (ref 3.5–5.2)
PROT SERPL-MCNC: 6.8 G/DL (ref 6.1–8)
RBC # BLD AUTO: 4.01 MILL/MM3 (ref 4.2–5.4)
SODIUM SERPL-SCNC: 141 MEQ/L (ref 135–145)
WBC # BLD AUTO: 6.6 THOU/MM3 (ref 4.8–10.8)

## 2024-10-31 ENCOUNTER — OFFICE VISIT (OUTPATIENT)
Dept: UROLOGY | Age: 69
End: 2024-10-31

## 2024-10-31 ENCOUNTER — HOSPITAL ENCOUNTER (OUTPATIENT)
Dept: GENERAL RADIOLOGY | Age: 69
Discharge: HOME | End: 2024-10-31
Attending: RADIOLOGY

## 2024-10-31 VITALS
SYSTOLIC BLOOD PRESSURE: 128 MMHG | DIASTOLIC BLOOD PRESSURE: 84 MMHG | WEIGHT: 177.4 LBS | BODY MASS INDEX: 28.51 KG/M2 | HEIGHT: 66 IN

## 2024-10-31 DIAGNOSIS — N20.0 KIDNEY STONE: Primary | ICD-10-CM

## 2024-10-31 DIAGNOSIS — Z00.6 ENCOUNTER FOR EXAMINATION FOR NORMAL COMPARISON AND CONTROL IN CLINICAL RESEARCH PROGRAM: ICD-10-CM

## 2024-10-31 RX ORDER — LINACLOTIDE 290 UG/1
290 CAPSULE, GELATIN COATED ORAL
COMMUNITY

## 2024-10-31 ASSESSMENT — ENCOUNTER SYMPTOMS
VOMITING: 0
NAUSEA: 0
BACK PAIN: 0
ABDOMINAL PAIN: 0

## 2024-10-31 NOTE — PROGRESS NOTES
[facility] [place] [facility]  [facility]  [address]  [facility] [address]  Dept: [phone]  Loc: [phone]    Visit Date: 10/31/2024        HPI:     Nallely Delgado is a 69 y.o. female who presents today for:  Chief Complaint   Patient presents with    Nephrolithiasis       HPI    Pt seen in follow up for kidney stones.      Nallely Samayoa has a hx of kidney stones with a septic stone in 2021.  Admitted to [facility] in Feb 2023 secondary to R UVJ calculus in setting of UTI and underwent cystoscopy with right ureteral stent placement on 2/21/23 by Dr. Parks with subsequent R ureteroscopic treatment of kidney stone 3/7/23 by Dr. Parks.     Reports she had a bladder tie up by a uro-gynecologist in [place].  Has f/u later this month.      Reports she is urinating well.  No frequency, urgency, dysuria, hematuria, impaired emptying.  No symptoms of stones at this time.  Completed KUB prior to appt.        Current Outpatient Medications   Medication Sig Dispense Refill    linaclotide (LINZESS) 290 MCG CAPS capsule Take 1 capsule by mouth every morning (before breakfast)      famotidine (PEPCID) 40 MG tablet Take 1 tablet by mouth 2 times daily      Artificial Tear Insert (LACRISERT) 5 MG INST Apply 1 applicator to eye 2 times daily      estradiol (ESTRACE) 0.1 MG/GM vaginal cream Place 0.5 g vaginally Twice a Week      nortriptyline (PAMELOR) 25 MG capsule Take 1 capsule by mouth 2 times daily as needed      Varenicline Tartrate (TYRVAYA) 0.03 MG/ACT SOLN 1 spray by Nasal route 2 times daily      Zinc 30 MG TABS Take 1 tablet by mouth Daily with lunch      NONFORMULARY 1 capsule daily Vitamin B12 plus folate      MAGNESIUM PO Take 3 capsules by mouth at bedtime      desvenlafaxine succinate (PRISTIQ) 100 MG TB24 extended release tablet Take 1 tablet by mouth daily (Patient taking differently: Take 1 tablet by mouth at bedtime) 30 tablet 1    cycloSPORINE (RESTASIS) 0.05 % ophthalmic

## 2025-04-21 ENCOUNTER — TRANSCRIBE ORDERS (OUTPATIENT)
Dept: ADMINISTRATIVE | Age: 70
End: 2025-04-21

## 2025-04-21 DIAGNOSIS — Z78.0 MENOPAUSE PRESENT: ICD-10-CM

## 2025-04-21 DIAGNOSIS — Z12.31 ENCOUNTER FOR SCREENING MAMMOGRAM FOR MALIGNANT NEOPLASM OF BREAST: Primary | ICD-10-CM

## 2025-04-21 DIAGNOSIS — Z78.0 ASYMPTOMATIC MENOPAUSAL STATE: ICD-10-CM

## (undated) DEVICE — DUAL LUMEN URETERAL CATHETER

## (undated) DEVICE — CYSTO PACK: Brand: MEDLINE INDUSTRIES, INC.

## (undated) DEVICE — SOLUTION IV IRRIG WATER 1000ML POUR BRL 2F7114

## (undated) DEVICE — NITINOL STONE RETRIEVAL BASKET: Brand: ZERO TIP

## (undated) DEVICE — GUIDEWIRE URO L150CM DIA0.035IN STIFF NIT HYDRPHLC STR TIP

## (undated) DEVICE — SOLUTION IRRIG 3000ML 0.9% SOD CHL USP UROMATIC PLAS CONT

## (undated) DEVICE — SINGLE ACTION PUMPING SYSTEM

## (undated) DEVICE — ADAPTER URO SCP UROLOK LL

## (undated) DEVICE — SYSTEM PMP VAC SYR 10CC CONT FLO SGL ACT 1 W VLV SAPS

## (undated) DEVICE — 3M™ STERI-STRIP™ COMPOUND BENZOIN TINCTURE 40 BAGS/CARTON 4 CARTONS/CASE C1544: Brand: 3M™ STERI-STRIP™

## (undated) DEVICE — Device